# Patient Record
Sex: FEMALE | Race: WHITE | NOT HISPANIC OR LATINO | Employment: OTHER | URBAN - NONMETROPOLITAN AREA
[De-identification: names, ages, dates, MRNs, and addresses within clinical notes are randomized per-mention and may not be internally consistent; named-entity substitution may affect disease eponyms.]

---

## 2017-01-20 DIAGNOSIS — N32.81 OAB (OVERACTIVE BLADDER): Primary | ICD-10-CM

## 2017-01-20 NOTE — TELEPHONE ENCOUNTER
VES[employer]      Last Written Prescription Date: 11/19/16  Last Fill Quantity: 90,  # refills: 0   Last Office Visit with [facility], [facility] or [facility] prescribing provider: 6/21/16                                             Xochitl Coyle [facility]  [phone]

## 2017-01-24 PROBLEM — N32.81 OAB (OVERACTIVE BLADDER): Status: ACTIVE | Noted: 2017-01-24

## 2017-01-24 RX ORDER — SOLIFENACIN SUCCINATE 5 MG/1
5 TABLET, FILM COATED ORAL DAILY
Qty: 30 TABLET | Refills: 0 | Status: SHIPPED | OUTPATIENT
Start: 2017-01-24 | End: 2018-01-24

## 2017-02-15 DIAGNOSIS — F33.0 MAJOR DEPRESSIVE DISORDER, RECURRENT EPISODE, MILD (H): ICD-10-CM

## 2017-02-15 NOTE — TELEPHONE ENCOUNTER
Seroquel       Last Written Prescription Date: 12/6/2016  Last Fill Quantity: 90; # refills: 1  Last Office Visit with [facility], [facility] or  [facility] prescribing provider:  6/21/2016        Last PHQ-9 score on record=   PHQ-9 SCORE 4/26/2016   Total Score -   Total Score 5       Lab Results   Component Value Date    AST 18 06/21/2016     Lab Results   Component Value Date    ALT 16 06/21/2016

## 2017-02-17 RX ORDER — QUETIAPINE FUMARATE 50 MG/1
TABLET, FILM COATED ORAL
Qty: 90 TABLET | Refills: 0 | Status: SHIPPED
Start: 2017-02-17 | End: 2017-06-20

## 2017-02-17 NOTE — TELEPHONE ENCOUNTER
Routing refill request to provider for review/approval because:  Labs out of range:  MARLON Tavera RN  [facility]

## 2017-02-17 NOTE — TELEPHONE ENCOUNTER
BP Readings from Last 3 Encounters:   06/21/16 110/64   04/26/16 114/62   11/18/15 111/66     Pulse Readings from Last 2 Encounters:   06/21/16 72   04/26/16 76     Lab Results   Component Value Date    GLC 73 06/21/2016     Lab Results   Component Value Date    WBC 7.6 06/21/2016     Lab Results   Component Value Date    RBC 4.11 06/21/2016     Lab Results   Component Value Date    HGB 13.2 06/21/2016     Lab Results   Component Value Date    HCT 39.3 06/21/2016     No components found for: MCT  Lab Results   Component Value Date    MCV 96 06/21/2016     Lab Results   Component Value Date    MCH 32.1 06/21/2016     Lab Results   Component Value Date    MCHC 33.6 06/21/2016     Lab Results   Component Value Date    RDW 13.6 06/21/2016     Lab Results   Component Value Date     06/21/2016     Lab Results   Component Value Date    CHOL 225 06/21/2016     Lab Results   Component Value Date    HDL 56 06/21/2016     Lab Results   Component Value Date     06/21/2016     09/13/2011     Lab Results   Component Value Date    TRIG 114 06/21/2016     No results found for: CHOLHDLRATIO

## 2017-04-18 ENCOUNTER — TELEPHONE (OUTPATIENT)
Dept: FAMILY MEDICINE | Facility: OTHER | Age: 52
End: 2017-04-18

## 2017-04-18 DIAGNOSIS — F33.0 MAJOR DEPRESSIVE DISORDER, RECURRENT EPISODE, MILD (H): ICD-10-CM

## 2017-04-18 NOTE — TELEPHONE ENCOUNTER
Attempted outreach to patient: Left message    Patient is due for:  Pap  Depression questions    If patient had this completed elsewhere, please obtain approximate date, clinic/hospital where test was done and the result (abnormal/normal).    Please assist in scheduling if not completed.          Panel Management Review      Patient has the following on her problem list:       Composite cancer screening  Chart review shows that this patient is due/due soon for the following   Summary:    Patient is due/failing the following:   PAP and PHQ9    Action needed:   Patient needs office visit for physical. and Patient needs to do PHQ9.    Type of outreach:    Phone, left message for patient to call back.     Questions for provider review:    None                                                                                                                                    Dasia LUCAS LPN       Chart routed to Dasia LUCAS LPN   .

## 2017-04-18 NOTE — TELEPHONE ENCOUNTER
Nilsa returns call and message below is relayed.  Nilsa declines making an appt at this time due to no insurance.  Pt states she will let us know when she has insurance again.

## 2017-04-18 NOTE — TELEPHONE ENCOUNTER
Zoloft     Last Written Prescription Date: 10-  Last Fill Quantity: 90, # refills: 1  Last Office Visit with [facility] primary care provider:  6-        Last PHQ-9 score on record=   PHQ-9 SCORE 4/26/2016   Total Score -   Total Score 5

## 2017-04-19 RX ORDER — SERTRALINE HYDROCHLORIDE 100 MG/1
TABLET, FILM COATED ORAL
Qty: 30 TABLET | Refills: 0 | Status: SHIPPED | OUTPATIENT
Start: 2017-04-19 | End: 2017-10-09

## 2017-04-19 NOTE — TELEPHONE ENCOUNTER
Routing refill request to provider for review/approval because:  PHQ-9 >4    Maris Tavera, RN  [facility]

## 2017-05-19 DIAGNOSIS — F33.0 MAJOR DEPRESSIVE DISORDER, RECURRENT EPISODE, MILD (H): ICD-10-CM

## 2017-05-19 NOTE — TELEPHONE ENCOUNTER
Routing refill request to provider for review/approval because:  Patient needs to be seen because:  Needs to establish care with provider. Due for office visit  PHQ-9 >4  PHQ-9 overdue    Maris Tavera RN  [facility]

## 2017-05-19 NOTE — TELEPHONE ENCOUNTER
zoloft      Last Written Prescription Date: 10/22/2016  Last Fill Quantity: 90, # refills: 1  Last Office Visit with [facility] primary care provider:  6/21/2016        Last PHQ-9 score on record=   PHQ-9 SCORE 4/26/2016   Total Score -   Total Score 5

## 2017-05-20 RX ORDER — SERTRALINE HYDROCHLORIDE 100 MG/1
TABLET, FILM COATED ORAL
Qty: 30 TABLET | Refills: 0 | Status: SHIPPED | OUTPATIENT
Start: 2017-05-20 | End: 2017-06-20

## 2017-06-02 ENCOUNTER — TELEPHONE (OUTPATIENT)
Dept: FAMILY MEDICINE | Facility: OTHER | Age: 52
End: 2017-06-02

## 2017-06-02 NOTE — TELEPHONE ENCOUNTER
Reason for call:  Patient reporting a symptom    Symptom or request: tick bite    Duration (how long have symptoms been present): 1 day    Have you been treated for this before? No    Additional comments: Nilsa would like to speak to a nurse regarding a tick bite    Phone Number patient can be reached at:  Cell number on file:    Telephone Information:   Mobile [phone]       Best Time:      Can we leave a detailed message on this number:  YES    Call taken on 6/2/2017 at 10:12 AM by Emely Giraldo

## 2017-06-02 NOTE — TELEPHONE ENCOUNTER
RN Tickbite Protocol: Ages 8 and older  Nilsa Chance is a 51 year old female is being assessed for indication of tick bite.  Pt states that she removed tick last evening around 5 pm, and showered, cleaned the area and applied some antibiotic ointment.        ASSESSMENT/PLAN:   1.  Patient instructions without treatment.   2.  Education: How to identify a deer tick, Important time frames related to tick bite and Preventing tick bites  3.  Follow-up: Follow with new/worsening symptoms.  4.  Patient/parent verbalized understanding of plan and is agreeable.     SUBJECTIVE/OBJECTIVE:  Removal of tick that has been attached at least 36 hours? no   Tick is smaller, redder, no white striping on back and more triangular in shape? no Pt states that tick seems a little smaller than an apple seed, but she is unsure of whether it is a deer tick.  State tick appears blackish.  Pt is concerned because her niece has been dealing with Lyme's Disease, and concerned about other tick illnesses that she's heard about.  Tick was removed within the last 72 hours? yes   Location on body of suspected tick bite: Backside of neck   Symptoms:  Denies Rash, weakness, body aches and fever >101F  Complicating factors:  Reports: NA   Denies: Allergy to Doxycycline, Age less than 8, Breast feeding, Pregnant and Greater than 72 hours since tick removed    NURSING PLAN: Routed to provider Yes and Nursing advice to patient see below    RECOMMENDED DISPOSITION:  Advised pt that tick she is describing sounds like a wood tick, not a deer tick.  Pt is concerned about other tick illnesses and Lyme's, and wondering if she needs any treatment.  States she doesn't have insurance right now, so is hoping to not need an office visit.  Advised pt that best plan of action is to monitor site for infection, and monitor for any new symptoms and to call the clinic if either occurs.  Advised pt that I can route to provider for review to see if any treatment is  indicated. Pt aware that we will only call back if treatment is advised.  Routing to covering provider, Dr. Gillis please advise.  Will comply with recommendation: N/A    Encounter handled by: Nurse Triage.    Pedro Hall RN

## 2017-06-20 ENCOUNTER — TELEPHONE (OUTPATIENT)
Dept: FAMILY MEDICINE | Facility: OTHER | Age: 52
End: 2017-06-20

## 2017-06-20 DIAGNOSIS — F33.0 MAJOR DEPRESSIVE DISORDER, RECURRENT EPISODE, MILD (H): ICD-10-CM

## 2017-06-20 RX ORDER — SERTRALINE HYDROCHLORIDE 100 MG/1
TABLET, FILM COATED ORAL
Qty: 90 TABLET | Refills: 0 | Status: SHIPPED | OUTPATIENT
Start: 2017-06-20 | End: 2017-09-18

## 2017-06-20 RX ORDER — QUETIAPINE FUMARATE 50 MG/1
TABLET, FILM COATED ORAL
Qty: 90 TABLET | Refills: 0 | Status: SHIPPED | OUTPATIENT
Start: 2017-06-20 | End: 2018-01-24

## 2017-06-24 ENCOUNTER — HEALTH MAINTENANCE LETTER (OUTPATIENT)
Age: 52
End: 2017-06-24

## 2017-09-18 DIAGNOSIS — F33.0 MAJOR DEPRESSIVE DISORDER, RECURRENT EPISODE, MILD (H): ICD-10-CM

## 2017-09-19 RX ORDER — SERTRALINE HYDROCHLORIDE 100 MG/1
100 TABLET, FILM COATED ORAL DAILY
Qty: 90 TABLET | Refills: 1 | Status: CANCELLED | OUTPATIENT
Start: 2017-09-19

## 2017-09-19 NOTE — TELEPHONE ENCOUNTER
sertraline (ZOLOFT) 100 MG tablet     Last Written Prescription Date: 6/20/2017  Last Fill Quantity: 90, # refills: 0  Last Office Visit with [facility] primary care provider:  6/21/2016        Last PHQ-9 score on record=   PHQ-9 SCORE 4/26/2016   Total Score -   Total Score 5

## 2017-09-20 RX ORDER — SERTRALINE HYDROCHLORIDE 100 MG/1
TABLET, FILM COATED ORAL
Qty: 15 TABLET | Refills: 0 | Status: SHIPPED | OUTPATIENT
Start: 2017-09-20 | End: 2017-10-09

## 2017-09-20 NOTE — TELEPHONE ENCOUNTER
Routing refill request to provider for review/approval because:  Isabelle given x1 and patient did not follow up  Patient needs to be seen because it has been more than 1 year since last office visit.    Martha Wolff, RN, BSN

## 2017-10-05 NOTE — PROGRESS NOTES
SUBJECTIVE:                                                    Nilsa Chance is a 51 year old female who presents to clinic today for the following health issues:      History of Present Illness   Depression & Anxiety Follow-up:     Depression/Anxiety:  Depression & Anxiety    Status since last visit::  Stable    Other associated symptoms of depression and anxiety::  YES    Significant life event::  YES ( is unemployed.)    Current substance use::  None  Diet::  Regular (no restrictions)  Frequency of exercise::  2-3 days/week  Duration of exercise::  15-30 minutes  Taking medications regularly::  Yes  Medication side effects::  None  Additional concerns today::  No    Problem list and histories reviewed & adjusted, as indicated.  Additional history: as documented    Patient Active Problem List   Diagnosis     Generalized anxiety disorder     Psychophysiological insomnia     Menopausal syndrome (hot flashes)     CARDIOVASCULAR SCREENING; LDL GOAL LESS THAN 160     Arthritis of big toe-Left     Major depressive disorder, recurrent episode, mild (H)     Other acne     Bee sting allergy     Hyperlipidemia LDL goal <100     OAB (overactive bladder)     Tobacco use disorder     Past Surgical History:   Procedure Laterality Date     BACK SURGERY  2001,2003     C/SECTION, LOW TRANSVERSE      C-Sections x2     ORTHOPEDIC SURGERY      great toe R fused.     PARTIAL HYSTERECTOMY  2007    Menorrhagia, sparing cervix     TONSILLECTOMY & ADENOIDECTOMY         Social History   Substance Use Topics     Smoking status: Current Every Day Smoker     Packs/day: 0.80     Years: 20.00     Types: Cigarettes     Smokeless tobacco: Never Used     Alcohol use No     Family History   Problem Relation Age of Onset     Thyroid Disease Mother      goiter and then drained and put on synthroid.     DIABETES Paternal Grandmother              ROS:  Constitutional, HEENT, cardiovascular, pulmonary, gi and gu systems are negative, except  "as otherwise noted.      OBJECTIVE:   /64 (Cuff Size: Adult Regular)  Pulse 72  Temp 97.6  F (36.4  C) (Temporal)  Resp 16  Ht 5' 4.5\" (1.638 m)  Wt 141 lb 6.4 oz (64.1 kg)  BMI 23.9 kg/m2  Body mass index is 23.9 kg/(m^2).  GENERAL: healthy, alert and no distress  RESP: lungs clear to auscultation - no rales, rhonchi or wheezes  CV: regular rate and rhythm, normal S1 S2, no S3 or S4, no murmur, click or rub, no peripheral edema and peripheral pulses strong  MS: no gross musculoskeletal defects noted, no edema  PSYCH: mentation appears normal, affect normal/bright    Diagnostic Test Results:  none     ASSESSMENT/PLAN:     1. Tobacco use disorder  Advised cessation - she is \"working on it\"  - TOBACCO CESSATION - FOR HEALTH MAINTENANCE  - DEPRESSION ACTION PLAN (DAP)    2. Major depressive disorder, recurrent episode, mild (H)  Doing well, no new concerns, denies suicidal thoughts and ideation.  - QUEtiapine (SEROQUEL) 50 MG tablet; Take 1 tablet (50 mg) by mouth At Bedtime  Dispense: 90 tablet; Refill: 1  - sertraline (ZOLOFT) 100 MG tablet; Take 1 tablet (100 mg) by mouth daily  Dispense: 90 tablet; Refill: 1    3. Psychophysiological insomnia  As directed    4. Special screening for malignant neoplasms, colon  Advised completion - she is currently without any employment as well as no health insurance. I did give her the information for the LUISA exam so that we can consider doing mammography and Pap smear she will look into this I think that they may cover the fit test as well otherwise is worse than $93 that it costs.  - Fecal colorectal cancer screen FIT; Future    Nick Barry PA-C  [facility]  Answers for HPI/ROS submitted by the patient on 10/9/2017   PHQ-2 Score: 0  If you checked off any problems, how difficult have these problems made it for you to do your work, take care of things at home, or get along with other people?: Not difficult at all  PHQ9 TOTAL SCORE: 1  TERRY 7 TOTAL " SCORE: 3

## 2017-10-09 ENCOUNTER — OFFICE VISIT (OUTPATIENT)
Dept: FAMILY MEDICINE | Facility: OTHER | Age: 52
End: 2017-10-09

## 2017-10-09 VITALS
RESPIRATION RATE: 16 BRPM | HEART RATE: 72 BPM | BODY MASS INDEX: 23.56 KG/M2 | WEIGHT: 141.4 LBS | DIASTOLIC BLOOD PRESSURE: 64 MMHG | HEIGHT: 65 IN | SYSTOLIC BLOOD PRESSURE: 110 MMHG | TEMPERATURE: 97.6 F

## 2017-10-09 DIAGNOSIS — F51.04 PSYCHOPHYSIOLOGICAL INSOMNIA: ICD-10-CM

## 2017-10-09 DIAGNOSIS — F33.0 MAJOR DEPRESSIVE DISORDER, RECURRENT EPISODE, MILD (H): ICD-10-CM

## 2017-10-09 DIAGNOSIS — F17.200 TOBACCO USE DISORDER: Primary | ICD-10-CM

## 2017-10-09 DIAGNOSIS — Z12.11 SPECIAL SCREENING FOR MALIGNANT NEOPLASMS, COLON: ICD-10-CM

## 2017-10-09 PROCEDURE — 99213 OFFICE O/P EST LOW 20 MIN: CPT | Performed by: PHYSICIAN ASSISTANT

## 2017-10-09 RX ORDER — QUETIAPINE FUMARATE 50 MG/1
TABLET, FILM COATED ORAL
Qty: 90 TABLET | Refills: 0 | Status: CANCELLED | OUTPATIENT
Start: 2017-10-09

## 2017-10-09 RX ORDER — SERTRALINE HYDROCHLORIDE 100 MG/1
100 TABLET, FILM COATED ORAL DAILY
Qty: 90 TABLET | Refills: 1 | Status: SHIPPED | OUTPATIENT
Start: 2017-10-09 | End: 2018-04-04

## 2017-10-09 RX ORDER — LORAZEPAM 0.5 MG/1
.5-1 TABLET ORAL EVERY 8 HOURS PRN
Qty: 16 TABLET | Refills: 0 | Status: CANCELLED | OUTPATIENT
Start: 2017-10-09

## 2017-10-09 RX ORDER — QUETIAPINE FUMARATE 50 MG/1
50 TABLET, FILM COATED ORAL AT BEDTIME
Qty: 90 TABLET | Refills: 1 | Status: SHIPPED | OUTPATIENT
Start: 2017-10-09 | End: 2018-04-30

## 2017-10-09 ASSESSMENT — ANXIETY QUESTIONNAIRES
7. FEELING AFRAID AS IF SOMETHING AWFUL MIGHT HAPPEN: NOT AT ALL
GAD7 TOTAL SCORE: 3
7. FEELING AFRAID AS IF SOMETHING AWFUL MIGHT HAPPEN: NOT AT ALL
GAD7 TOTAL SCORE: 3
GAD7 TOTAL SCORE: 3
3. WORRYING TOO MUCH ABOUT DIFFERENT THINGS: SEVERAL DAYS
1. FEELING NERVOUS, ANXIOUS, OR ON EDGE: SEVERAL DAYS
5. BEING SO RESTLESS THAT IT IS HARD TO SIT STILL: NOT AT ALL
4. TROUBLE RELAXING: NOT AT ALL
2. NOT BEING ABLE TO STOP OR CONTROL WORRYING: SEVERAL DAYS
6. BECOMING EASILY ANNOYED OR IRRITABLE: NOT AT ALL

## 2017-10-09 ASSESSMENT — PAIN SCALES - GENERAL: PAINLEVEL: NO PAIN (0)

## 2017-10-09 ASSESSMENT — PATIENT HEALTH QUESTIONNAIRE - PHQ9
SUM OF ALL RESPONSES TO PHQ QUESTIONS 1-9: 1
10. IF YOU CHECKED OFF ANY PROBLEMS, HOW DIFFICULT HAVE THESE PROBLEMS MADE IT FOR YOU TO DO YOUR WORK, TAKE CARE OF THINGS AT HOME, OR GET ALONG WITH OTHER PEOPLE: NOT DIFFICULT AT ALL
SUM OF ALL RESPONSES TO PHQ QUESTIONS 1-9: 1

## 2017-10-09 NOTE — NURSING NOTE
"Chief Complaint   Patient presents with     Recheck Medication     Panel Management     MyChart, Flu shot, FIT test, Pap, Tobacco cessation, dap, phq, yolette       Initial /64 (Cuff Size: Adult Regular)  Pulse 72  Temp 97.6  F (36.4  C) (Temporal)  Resp 16  Ht 5' 4.5\" (1.638 m)  Wt 141 lb 6.4 oz (64.1 kg)  BMI 23.9 kg/m2 Estimated body mass index is 23.9 kg/(m^2) as calculated from the following:    Height as of this encounter: 5' 4.5\" (1.638 m).    Weight as of this encounter: 141 lb 6.4 oz (64.1 kg).  Medication Reconciliation: complete   Luh Cage CMA (AAMA)    "

## 2017-10-09 NOTE — LETTER
My Depression Action Plan  Name: Nilsa Chance   Date of Birth 1965  Date: 10/5/2017    My doctor: Nick Felder   My clinic: [address] [address] [phone]          GREEN    ZONE   Good Control    What it looks like:     Things are going generally well. You have normal up s and down s. You may even feel depressed from time to time, but bad moods usually last less than a day.   What you need to do:  1. Continue to care for yourself (see self care plan)  2. Check your depression survival kit and update it as needed  3. Follow your physician s recommendations including any medication.  4. Do not stop taking medication unless you consult with your physician first.           YELLOW         ZONE Getting Worse    What it looks like:     Depression is starting to interfere with your life.     It may be hard to get out of bed; you may be starting to isolate yourself from others.    Symptoms of depression are starting to last most all day and this has happened for several days.     You may have suicidal thoughts but they are not constant.   What you need to do:     1. Call your care team, your response to treatment will improve if you keep your care team informed of your progress. Yellow periods are signs an adjustment may need to be made.     2. Continue your self-care, even if you have to fake it!    3. Talk to someone in your support network    4. Open up your depression survival kit           RED    ZONE Medical Alert - Get Help    What it looks like:     Depression is seriously interfering with your life.     You may experience these or other symptoms: You can t get out of bed most days, can t work or engage in other necessary activities, you have trouble taking care of basic hygiene, or basic responsibilities, thoughts of suicide or death that will not go away, self-injurious behavior.     What you need to do:  1. Call your care team and  request a same-day appointment. If they are not available (weekends or after hours) call your local crisis line, emergency room or 911.      Electronically signed by: Shasha Collins, October 5, 2017    Depression Self Care Plan / Survival Kit    Self-Care for Depression  Here s the deal. Your body and mind are really not as separate as most people think.  What you do and think affects how you feel and how you feel influences what you do and think. This means if you do things that people who feel good do, it will help you feel better.  Sometimes this is all it takes.  There is also a place for medication and therapy depending on how severe your depression is, so be sure to consult with your medical provider and/ or Behavioral Health Consultant if your symptoms are worsening or not improving.     In order to better manage my stress, I will:    Exercise  Get some form of exercise, every day. This will help reduce pain and release endorphins, the  feel good  chemicals in your brain. This is almost as good as taking antidepressants!  This is not the same as joining a gym and then never going! (they count on that by the way ) It can be as simple as just going for a walk or doing some gardening, anything that will get you moving.      Hygiene   Maintain good hygiene (Get out of bed in the morning, Make your bed, Brush your teeth, Take a shower, and Get dressed like you were going to work, even if you are unemployed).  If your clothes don't fit try to get ones that do.    Diet  I will strive to eat foods that are good for me, drink plenty of water, and avoid excessive sugar, caffeine, alcohol, and other mood-altering substances.  Some foods that are helpful in depression are: complex carbohydrates, B vitamins, flaxseed, fish or fish oil, fresh fruits and vegetables.    Psychotherapy  I agree to participate in Individual Therapy (if recommended).    Medication  If prescribed medications, I agree to take them.   Missing doses can result in serious side effects.  I understand that drinking alcohol, or other illicit drug use, may cause potential side effects.  I will not stop my medication abruptly without first discussing it with my provider.    Staying Connected With Others  I will stay in touch with my friends, family members, and my primary care provider/team.    Use your imagination  Be creative.  We all have a creative side; it doesn t matter if it s oil painting, sand castles, or mud pies! This will also kick up the endorphins.    Witness Beauty  (AKA stop and smell the roses) Take a look outside, even in mid-winter. Notice colors, textures. Watch the squirrels and birds.     Service to others  Be of service to others.  There is always someone else in need.  By helping others we can  get out of ourselves  and remember the really important things.  This also provides opportunities for practicing all the other parts of the program.    Humor  Laugh and be silly!  Adjust your TV habits for less news and crime-drama and more comedy.    Control your stress  Try breathing deep, massage therapy, biofeedback, and meditation. Find time to relax each day.     My support system    Clinic Contact:  Phone number:    Contact 1:  Phone number:    Contact 2:  Phone number:    Taoism/:  Phone number:    Therapist:  Phone number:    Local crisis center:    Phone number:    Other community support:  Phone number:

## 2017-10-09 NOTE — MR AVS SNAPSHOT
After Visit Summary   10/9/2017    Nilsa Chance    MRN: [id number]           Patient Information     Date Of Birth          1965        Visit Information        Provider Department      10/9/2017 2:00 PM Nick Felder PA-C [facility]        Today's Diagnoses     Tobacco use disorder    -  1    Menopausal syndrome (hot flashes)        Major depressive disorder, recurrent episode, mild (H)        Psychophysiological insomnia        Special screening for malignant neoplasms, colon           Follow-ups after your visit        Your next 10 appointments already scheduled     Oct 09, 2017  2:00 PM CDT   Office Visit with Nick Felder PA-C   [facility]    [address] [facility] [address] [phone]           Bring a current list of meds and any records pertaining to this visit. For Physicals, please bring immunization records and any forms needing to be filled out. Please arrive 10 minutes early to complete paperwork.              Future tests that were ordered for you today     Open Future Orders        Priority Expected Expires Ordered    Fecal colorectal cancer screen FIT Routine 10/26/2017 12/28/2017 10/9/2017            Who to contact     If you have questions or need follow up information about today's clinic visit or your schedule please contact [facility] directly at [phone].  Normal or non-critical lab and imaging results will be communicated to you by MyChart, letter or phone within 4 business days after the clinic has received the results. If you do not hear from us within 7 days, please contact the clinic through MyChart or phone. If you have a critical or abnormal lab result, we will notify you by phone as soon as possible.  Submit refill requests through [employer] or call your pharmacy and they will forward the refill request to us. Please allow 3 business days for your refill to be  "completed.          Additional Information About Your Visit        Se[employer] Information     [employer] lets you send messages to your doctor, view your test results, renew your prescriptions, schedule appointments and more. To sign up, go to [email or web address] . Click on \"Log in\" on the left side of the screen, which will take you to the Welcome page. Then click on \"Sign up Now\" on the right side of the page.     You will be asked to enter the access code listed below, as well as some personal information. Please follow the directions to create your username and password.     Your access code is: [email or web address]  Expires: 2018  1:51 PM     Your access code will  in 90 days. If you need help or a new code, please call your [place] clinic or [phone].        Care EveryWhere ID     This is your Care EveryWhere ID. This could be used by other organizations to access your [place] medical records  [id number]        Your Vitals Were     Pulse Temperature Respirations Height BMI (Body Mass Index)       72 97.6  F (36.4  C) (Temporal) 16 5' 4.5\" (1.638 m) 23.9 kg/m2        Blood Pressure from Last 3 Encounters:   10/09/17 110/64   16 110/64   16 114/62    Weight from Last 3 Encounters:   10/09/17 141 lb 6.4 oz (64.1 kg)   16 139 lb 9.6 oz (63.3 kg)   16 140 lb (63.5 kg)              We Performed the Following     DEPRESSION ACTION PLAN (DAP)     TOBACCO CESSATION - FOR HEALTH MAINTENANCE          Today's Medication Changes          These changes are accurate as of: 10/9/17  1:51 PM.  If you have any questions, ask your nurse or doctor.               These medicines have changed or have updated prescriptions.        Dose/Directions    * QUEtiapine 50 MG tablet   Commonly known as:  SEROquel   This may have changed:  Another medication with the same name was changed. Make sure you understand how and when to take each.   Used for:  Major depressive disorder, recurrent episode, mild (H) "   Changed by:  Nick Felder PA-C        TAKE ONE-HALF TO ONE TABLET BY MOUTH AT BEDTIME --NEED OFFICE VISIT   Quantity:  90 tablet   Refills:  0       * QUEtiapine 50 MG tablet   Commonly known as:  SEROquel   This may have changed:  See the new instructions.   Used for:  Major depressive disorder, recurrent episode, mild (H)   Changed by:  Nick Felder PA-C        Dose:  50 mg   Take 1 tablet (50 mg) by mouth At Bedtime   Quantity:  90 tablet   Refills:  1       sertraline 100 MG tablet   Commonly known as:  ZOLOFT   This may have changed:  See the new instructions.   Used for:  Major depressive disorder, recurrent episode, mild (H)   Changed by:  Nick Felder PA-C        Dose:  100 mg   Take 1 tablet (100 mg) by mouth daily   Quantity:  90 tablet   Refills:  1       * Notice:  This list has 2 medication(s) that are the same as other medications prescribed for you. Read the directions carefully, and ask your doctor or other care provider to review them with you.         Where to get your medicines      These medications were sent to [facility] [address] [facility] [address] [place] [address]     Phone:  [phone]     QUEtiapine 50 MG tablet    sertraline 100 MG tablet                Primary Care Provider Office Phone # Fax #    Nick Felder PA-C [phone]       [address] [facility] [address]        Equal Access to Services     [facility] Hospital[facility] : Hadii isaura fajardo hadkateo Sonoe, waaxda luqadaha, qaybta kaalmada aderyanyada, shelby hollingsworth . So [facility] [phone].    ATENCIÓN: Si habla español, tiene a paredes disposición servicios gratuitos de asistencia lingüística. Derikame al [phone].    We comply with applicable federal civil rights laws and [place] laws. We do not discriminate on the basis of race, color, national origin, age, disability, sex, sexual orientation, or gender identity.            Thank you!     Thank you for choosing [place]  [facility]  for your care. Our goal is always to provide you with excellent care. Hearing back from our patients is one way we can continue to improve our services. Please take a few minutes to complete the written survey that you may receive in the mail after your visit with us. Thank you!             Your Updated Medication List - Protect others around you: Learn how to safely use, store and throw away your medicines at www.disposemymeds.org.          This list is accurate as of: 10/9/17  1:51 PM.  Always use your most recent med list.                   Brand Name Dispense Instructions for use Diagnosis    clindamycin 1 % topical gel    CLINDAMAX    60 g    Apply topically 2 times daily    Other acne       cyclobenzaprine 10 MG tablet    FLEXERIL    30 tablet    Take 1 tablet (10 mg) by mouth 3 times daily as needed for muscle spasms    Neck pain       EPINEPHrine 0.3 MG/0.3ML injection 2-pack    EPIPEN 2-TRES    2 each    Inject 0.3 mLs (0.3 mg) into the muscle once as needed for anaphylaxis    Allergy to insect bites and stings       lactobacillus Tabs      Take 1 tablet by mouth daily.        LORazepam 0.5 MG tablet    ATIVAN    16 tablet    Take 1-2 tablets (0.5-1 mg) by mouth every 8 hours as needed for anxiety    Generalised anxiety disorder       * QUEtiapine 50 MG tablet    SEROquel    90 tablet    TAKE ONE-HALF TO ONE TABLET BY MOUTH AT BEDTIME --NEED OFFICE VISIT    Major depressive disorder, recurrent episode, mild (H)       * QUEtiapine 50 MG tablet    SEROquel    90 tablet    Take 1 tablet (50 mg) by mouth At Bedtime    Major depressive disorder, recurrent episode, mild (H)       sertraline 100 MG tablet    ZOLOFT    90 tablet    Take 1 tablet (100 mg) by mouth daily    Major depressive disorder, recurrent episode, mild (H)       simvastatin 20 MG tablet    ZOCOR    90 tablet    TAKE ONE TABLET BY MOUTH AT BEDTIME    Hyperlipidemia LDL goal <100       solifenacin 5 MG tablet    VESICARE    30  tablet    Take 1 tablet (5 mg) by mouth daily    OAB (overactive bladder)       * Notice:  This list has 2 medication(s) that are the same as other medications prescribed for you. Read the directions carefully, and ask your doctor or other care provider to review them with you.

## 2017-10-10 ASSESSMENT — ANXIETY QUESTIONNAIRES: GAD7 TOTAL SCORE: 3

## 2017-10-10 ASSESSMENT — PATIENT HEALTH QUESTIONNAIRE - PHQ9: SUM OF ALL RESPONSES TO PHQ QUESTIONS 1-9: 1

## 2017-12-11 ENCOUNTER — TELEPHONE (OUTPATIENT)
Dept: FAMILY MEDICINE | Facility: OTHER | Age: 52
End: 2017-12-11

## 2017-12-11 DIAGNOSIS — Z12.31 ENCOUNTER FOR SCREENING MAMMOGRAM FOR BREAST CANCER: Primary | ICD-10-CM

## 2017-12-11 NOTE — TELEPHONE ENCOUNTER
Summary:    Patient is due/failing the following:   FIT and PAP    Action needed:   Patient needs office visit for PAP . and complete a FIT test    Type of outreach:    Phone, spoke to patient.  patient scheduled    Questions for provider review:    None                                                                                                                                    Roopa Rico       Chart routed to Care Team .      Panel Management Review      Patient has the following on her problem list:     Depression / Dysthymia review    Measure:  Needs PHQ-9 score of 4 or less during index window.  Administer PHQ-9 and if score is 5 or more, send encounter to provider for next steps.      PHQ-9 SCORE 11/24/2014 4/26/2016 10/9/2017   Total Score 0 - -   Total Score MyChart - - 1 (Minimal depression)   Total Score - 5 1       If PHQ-9 recheck is 5 or more, route to provider for next steps.    Patient is due for:  PHQ9        Composite cancer screening  Chart review shows that this patient is due/due soon for the following Pap Smear and Fecal Colorectal (FIT)

## 2018-01-24 ENCOUNTER — OFFICE VISIT (OUTPATIENT)
Dept: FAMILY MEDICINE | Facility: OTHER | Age: 53
End: 2018-01-24
Payer: COMMERCIAL

## 2018-01-24 VITALS
HEIGHT: 64 IN | TEMPERATURE: 98.2 F | BODY MASS INDEX: 23.39 KG/M2 | WEIGHT: 137 LBS | SYSTOLIC BLOOD PRESSURE: 110 MMHG | HEART RATE: 82 BPM | RESPIRATION RATE: 16 BRPM | DIASTOLIC BLOOD PRESSURE: 76 MMHG | OXYGEN SATURATION: 99 %

## 2018-01-24 DIAGNOSIS — Z12.4 SCREENING FOR CERVICAL CANCER: ICD-10-CM

## 2018-01-24 DIAGNOSIS — N95.2 VAGINAL ATROPHY: ICD-10-CM

## 2018-01-24 DIAGNOSIS — Z13.1 SCREENING FOR DIABETES MELLITUS: ICD-10-CM

## 2018-01-24 DIAGNOSIS — Z00.00 ROUTINE GENERAL MEDICAL EXAMINATION AT A HEALTH CARE FACILITY: Primary | ICD-10-CM

## 2018-01-24 DIAGNOSIS — N94.10 DYSPAREUNIA IN FEMALE: ICD-10-CM

## 2018-01-24 DIAGNOSIS — R68.82 DECREASED SEX DRIVE: ICD-10-CM

## 2018-01-24 DIAGNOSIS — Z13.6 CARDIOVASCULAR SCREENING; LDL GOAL LESS THAN 160: ICD-10-CM

## 2018-01-24 PROCEDURE — G0145 SCR C/V CYTO,THINLAYER,RESCR: HCPCS | Performed by: NURSE PRACTITIONER

## 2018-01-24 PROCEDURE — 87624 HPV HI-RISK TYP POOLED RSLT: CPT | Performed by: NURSE PRACTITIONER

## 2018-01-24 PROCEDURE — 99396 PREV VISIT EST AGE 40-64: CPT | Performed by: NURSE PRACTITIONER

## 2018-01-24 NOTE — PATIENT INSTRUCTIONS
The results of the pap test take about 2 weeks to come back.  We will send a letter with these results and the recommendations for further pap tests in the future.     Come back for fasting labs.     I put in a referral for OB/GYN if you want to see them.     Preventive Health Recommendations  Female Ages 50 - 64    Yearly exam: See your health care provider every year in order to  o Review health changes.   o Discuss preventive care.    o Review your medicines if your doctor has prescribed any.      Get a Pap test every three years (unless you have an abnormal result and your provider advises testing more often).    If you get Pap tests with HPV test, you only need to test every 5 years, unless you have an abnormal result.     You do not need a Pap test if your uterus was removed (hysterectomy) and you have not had cancer.    You should be tested each year for STDs (sexually transmitted diseases) if you're at risk.     Have a mammogram every 1 to 2 years.    Have a colonoscopy at age 50, or have a yearly FIT test (stool test). These exams screen for colon cancer.      Have a cholesterol test every 5 years, or more often if advised.    Have a diabetes test (fasting glucose) every three years. If you are at risk for diabetes, you should have this test more often.     If you are at risk for osteoporosis (brittle bone disease), think about having a bone density scan (DEXA).    Shots: Get a flu shot each year. Get a tetanus shot every 10 years.    Nutrition:     Eat at least 5 servings of fruits and vegetables each day.    Eat whole-grain bread, whole-wheat pasta and brown rice instead of white grains and rice.    Talk to your provider about Calcium and Vitamin D.     Lifestyle    Exercise at least 150 minutes a week (30 minutes a day, 5 days a week). This will help you control your weight and prevent disease.    Limit alcohol to one drink per day.    No smoking.     Wear sunscreen to prevent skin cancer.     See your  dentist every six months for an exam and cleaning.    See your eye doctor every 1 to 2 years.

## 2018-01-24 NOTE — MR AVS SNAPSHOT
After Visit Summary   1/24/2018    Nilsa Chance    MRN: [id number]           Patient Information     Date Of Birth          1965        Visit Information        Provider Department      1/24/2018 4:20 PM Leanne Fitzpatrick APRN [facility]        Today's Diagnoses     Routine general medical examination at a health care facility    -  1    Screening for cervical cancer        Screening for diabetes mellitus        CARDIOVASCULAR SCREENING; LDL GOAL LESS THAN 160        Dyspareunia in female        Vaginal atrophy        Decreased sex drive          Care Instructions    The results of the pap test take about 2 weeks to come back.  We will send a letter with these results and the recommendations for further pap tests in the future.     Come back for fasting labs.     I put in a referral for OB/GYN if you want to see them.     Preventive Health Recommendations  Female Ages 50 - 64    Yearly exam: See your health care provider every year in order to  o Review health changes.   o Discuss preventive care.    o Review your medicines if your doctor has prescribed any.      Get a Pap test every three years (unless you have an abnormal result and your provider advises testing more often).    If you get Pap tests with HPV test, you only need to test every 5 years, unless you have an abnormal result.     You do not need a Pap test if your uterus was removed (hysterectomy) and you have not had cancer.    You should be tested each year for STDs (sexually transmitted diseases) if you're at risk.     Have a mammogram every 1 to 2 years.    Have a colonoscopy at age 50, or have a yearly FIT test (stool test). These exams screen for colon cancer.      Have a cholesterol test every 5 years, or more often if advised.    Have a diabetes test (fasting glucose) every three years. If you are at risk for diabetes, you should have this test more often.     If you are at risk for osteoporosis (brittle  bone disease), think about having a bone density scan (DEXA).    Shots: Get a flu shot each year. Get a tetanus shot every 10 years.    Nutrition:     Eat at least 5 servings of fruits and vegetables each day.    Eat whole-grain bread, whole-wheat pasta and brown rice instead of white grains and rice.    Talk to your provider about Calcium and Vitamin D.     Lifestyle    Exercise at least 150 minutes a week (30 minutes a day, 5 days a week). This will help you control your weight and prevent disease.    Limit alcohol to one drink per day.    No smoking.     Wear sunscreen to prevent skin cancer.     See your dentist every six months for an exam and cleaning.    See your eye doctor every 1 to 2 years.            Follow-ups after your visit        Additional Services     OB/GYN REFERRAL       Your provider has referred you to:  [facility]: [facility] [phone]   [email or web address]    Please be aware that coverage of these services is subject to the terms and limitations of your health insurance plan.  Call member services at your health plan with any benefit or coverage questions.      Please bring the following with you to your appointment:    (1) Any X-Rays, CTs or MRIs which have been performed.  Contact the facility where they were done to arrange for  prior to your scheduled appointment.   (2) List of current medications   (3) This referral request   (4) Any documents/labs given to you for this referral                  Future tests that were ordered for you today     Open Future Orders        Priority Expected Expires Ordered    Lipid panel reflex to direct LDL Fasting Routine  1/24/2019 1/24/2018    Glucose Routine  1/24/2019 1/24/2018            Who to contact     If you have questions or need follow up information about today's clinic visit or your schedule please contact [facility] directly at [phone].  Normal or non-critical lab and  "imaging results will be communicated to you by MyChart, letter or phone within 4 business days after the clinic has received the results. If you do not hear from us within 7 days, please contact the clinic through [employer] or phone. If you have a critical or abnormal lab result, we will notify you by phone as soon as possible.  Submit refill requests through [employer] or call your pharmacy and they will forward the refill request to us. Please allow 3 business days for your refill to be completed.          Additional Information About Your Visit        [employer] Information     [employer] lets you send messages to your doctor, view your test results, renew your prescriptions, schedule appointments and more. To sign up, go to [email or web address] . Click on \"Log in\" on the left side of the screen, which will take you to the Welcome page. Then click on \"Sign up Now\" on the right side of the page.     You will be asked to enter the access code listed below, as well as some personal information. Please follow the directions to create your username and password.     Your access code is: [email or web address]  Expires: 2018  4:59 PM     Your access code will  in 90 days. If you need help or a new code, please call your [place] clinic or [phone].        Care EveryWhere ID     This is your Care EveryWhere ID. This could be used by other organizations to access your [place] medical records  [id number]        Your Vitals Were     Pulse Temperature Respirations Height Pulse Oximetry Breastfeeding?    82 98.2  F (36.8  C) (Tympanic) 16 5' 4\" (1.626 m) 99% No    BMI (Body Mass Index)                   23.52 kg/m2            Blood Pressure from Last 3 Encounters:   18 110/76   10/09/17 110/64   16 110/64    Weight from Last 3 Encounters:   18 137 lb (62.1 kg)   10/09/17 141 lb 6.4 oz (64.1 kg)   16 139 lb 9.6 oz (63.3 kg)              We Performed the Following     HPV High Risk Types DNA Cervical  "    OB/GYN REFERRAL     Pap imaged thin layer screen with HPV - recommended age 30 - 65        Primary Care Provider Office Phone # Fax #    Nick Felder PA-C [phone]       [facility] [id number] [place] [id number]        Equal Access to Services     SETH ABBASI : Hadii isaura ku hadasho Soomaali, waaxda luqadaha, qaybta kaalmada adeegyada, waxay maira haygiacomo oneiltayyosi orona. So [facility] [phone].    ATENCIÓN: Si habla español, tiene a paredes disposición servicios gratuitos de asistencia lingüística. Derikame al [phone].    We comply with applicable federal civil rights laws and [place] laws. We do not discriminate on the basis of race, color, national origin, age, disability, sex, sexual orientation, or gender identity.            Thank you!     Thank you for choosing [facility]  for your care. Our goal is always to provide you with excellent care. Hearing back from our patients is one way we can continue to improve our services. Please take a few minutes to complete the written survey that you may receive in the mail after your visit with us. Thank you!             Your Updated Medication List - Protect others around you: Learn how to safely use, store and throw away your medicines at www.disposemymeds.org.          This list is accurate as of 1/24/18  4:59 PM.  Always use your most recent med list.                   Brand Name Dispense Instructions for use Diagnosis    clindamycin 1 % topical gel    CLINDAMAX    60 g    Apply topically 2 times daily    Other acne       cyclobenzaprine 10 MG tablet    FLEXERIL    30 tablet    Take 1 tablet (10 mg) by mouth 3 times daily as needed for muscle spasms    Neck pain       EPINEPHrine 0.3 MG/0.3ML injection 2-pack    EPIPEN 2-TRES    2 each    Inject 0.3 mLs (0.3 mg) into the muscle once as needed for anaphylaxis    Allergy to insect bites and stings       lactobacillus Tabs      Take 1 tablet by mouth daily.         LORazepam 0.5 MG tablet    ATIVAN    16 tablet    Take 1-2 tablets (0.5-1 mg) by mouth every 8 hours as needed for anxiety    Generalised anxiety disorder       QUEtiapine 50 MG tablet    SEROquel    90 tablet    Take 1 tablet (50 mg) by mouth At Bedtime    Major depressive disorder, recurrent episode, mild (H)       sertraline 100 MG tablet    ZOLOFT    90 tablet    Take 1 tablet (100 mg) by mouth daily    Major depressive disorder, recurrent episode, mild (H)

## 2018-01-24 NOTE — NURSING NOTE
"Chief Complaint   Patient presents with     Physical     Health Maintenance     pap, fit,  declines flu shot     Gastrophageal Reflux       Initial /76  Pulse 82  Temp 98.2  F (36.8  C) (Tympanic)  Resp 16  Ht 5' 4\" (1.626 m)  Wt 137 lb (62.1 kg)  SpO2 99%  Breastfeeding? No  BMI 23.52 kg/m2 Estimated body mass index is 23.52 kg/(m^2) as calculated from the following:    Height as of this encounter: 5' 4\" (1.626 m).    Weight as of this encounter: 137 lb (62.1 kg).  Medication Reconciliation: complete   ................Malick Yung LPN,   January 24, 2018,      4:36 PM,   [facility]    "

## 2018-01-24 NOTE — PROGRESS NOTES
SUBJECTIVE:   CC: Nilsa Chance is an 52 year old woman who presents for preventive health visit.     Healthy Habits:    Do you get at least three servings of calcium containing foods daily (dairy, green leafy vegetables, etc.)? yes    Amount of exercise or daily activities, outside of work: physically active lifestyle    Problems taking medications regularly No    Medication side effects: No    Have you had an eye exam in the past two years? yes    Do you see a dentist twice per year? no    Do you have sleep apnea, excessive snoring or daytime drowsiness?no    She is having vaginal dryness, decreased libido and pain with intercourse over the past few months.  Is having hot flashes as well.  S/p hysterectomy several years ago for endometriosis.  Ovaries and cervix were left in place.     Today's PHQ-2 Score:   PHQ-2 ( 1999 [employer]) 1/24/2018 10/9/2017   Q1: Little interest or pleasure in doing things 1 0   Q2: Feeling down, depressed or hopeless 1 0   PHQ-2 Score 2 0   Q1: Little interest or pleasure in doing things - Not at all   Q2: Feeling down, depressed or hopeless - Not at all   PHQ-2 Score - 0       Abuse: Current or Past(Physical, Sexual or Emotional)- No  Do you feel safe in your environment - Yes    Social History   Substance Use Topics     Smoking status: Current Every Day Smoker     Packs/day: 0.80     Years: 20.00     Types: Cigarettes     Smokeless tobacco: Never Used     Alcohol use No     If you drink alcohol do you typically have >3 drinks per day or >7 drinks per week? No                     Reviewed orders with patient.  Reviewed health maintenance and updated orders accordingly - Yes  BP Readings from Last 3 Encounters:   01/24/18 110/76   10/09/17 110/64   06/21/16 110/64    Wt Readings from Last 3 Encounters:   01/24/18 137 lb (62.1 kg)   10/09/17 141 lb 6.4 oz (64.1 kg)   06/21/16 139 lb 9.6 oz (63.3 kg)                  Patient Active Problem List   Diagnosis     Generalized anxiety  disorder     Psychophysiological insomnia     Menopausal syndrome (hot flashes)     CARDIOVASCULAR SCREENING; LDL GOAL LESS THAN 160     Arthritis of big toe-Left     Major depressive disorder, recurrent episode, mild (H)     Other acne     Bee sting allergy     Hyperlipidemia LDL goal <100     OAB (overactive bladder)     Tobacco use disorder     Past Surgical History:   Procedure Laterality Date     BACK SURGERY  2001,2003     C/SECTION, LOW TRANSVERSE      C-Sections x2     ORTHOPEDIC SURGERY      great toe R fused.     PARTIAL HYSTERECTOMY  2007    Menorrhagia, sparing cervix     TONSILLECTOMY & ADENOIDECTOMY         Social History   Substance Use Topics     Smoking status: Current Every Day Smoker     Packs/day: 0.80     Years: 20.00     Types: Cigarettes     Smokeless tobacco: Never Used     Alcohol use No     Family History   Problem Relation Age of Onset     Thyroid Disease Mother      goiter and then drained and put on synthroid.     DIABETES Paternal Grandmother          Current Outpatient Prescriptions   Medication Sig Dispense Refill     QUEtiapine (SEROQUEL) 50 MG tablet Take 1 tablet (50 mg) by mouth At Bedtime 90 tablet 1     sertraline (ZOLOFT) 100 MG tablet Take 1 tablet (100 mg) by mouth daily 90 tablet 1     EPINEPHrine (EPIPEN 2-TRES) 0.3 MG/0.3ML injection Inject 0.3 mLs (0.3 mg) into the muscle once as needed for anaphylaxis 2 each 1     clindamycin (CLINDAMAX) 1 % gel Apply topically 2 times daily 60 g 11     LORazepam (ATIVAN) 0.5 MG tablet Take 1-2 tablets (0.5-1 mg) by mouth every 8 hours as needed for anxiety 16 tablet 0     cyclobenzaprine (FLEXERIL) 10 MG tablet Take 1 tablet (10 mg) by mouth 3 times daily as needed for muscle spasms 30 tablet 0     Lactobacillus TABS Take 1 tablet by mouth daily.       [DISCONTINUED] QUEtiapine (SEROQUEL) 50 MG tablet TAKE ONE-HALF TO ONE TABLET BY MOUTH AT BEDTIME --NEED OFFICE VISIT 90 tablet 0     Allergies   Allergen Reactions     Codeine Sulfate  Hives     Paxil [Serotonin Reuptake Inhibitors]      Sleep problems, jittery       Patient over age 50, mutual decision to screen reflected in health maintenance.    Pertinent mammograms are reviewed under the imaging tab.  History of abnormal Pap smear: NO - age 30-65 PAP every 5 years with negative HPV co-testing recommended    Reviewed and updated as needed this visit by clinical staff  Tobacco  Allergies  Meds  Med Hx  Surg Hx  Fam Hx  Soc Hx        Reviewed and updated as needed this visit by Provider        Past Medical History:   Diagnosis Date     Anxiety      Bee sting allergy 4/26/2016     CARDIOVASCULAR SCREENING; LDL GOAL LESS THAN 160 6/29/2011     Depressive disorder      Generalised anxiety disorder 1/17/2011     Insomnia 1/17/2011     Menopausal syndrome (hot flashes) 3/8/2011     OAB (overactive bladder) 1/24/2017     Other acne 4/26/2016     Panic attacks       Past Surgical History:   Procedure Laterality Date     BACK SURGERY  2001,2003     C/SECTION, LOW TRANSVERSE      C-Sections x2     ORTHOPEDIC SURGERY      great toe R fused.     PARTIAL HYSTERECTOMY  2007    Menorrhagia, sparing cervix     TONSILLECTOMY & ADENOIDECTOMY         ROS:  C: NEGATIVE for fever, chills, change in weight  I: NEGATIVE for worrisome rashes, moles or lesions  E: NEGATIVE for vision changes or irritation  ENT: NEGATIVE for ear, mouth and throat problems  R: NEGATIVE for significant cough or SOB  B: NEGATIVE for masses, tenderness or discharge  CV: NEGATIVE for chest pain, palpitations or peripheral edema  GI: NEGATIVE for nausea, abdominal pain, heartburn, or change in bowel habits  : NEGATIVE for unusual urinary or vaginal symptoms. No vaginal bleeding.  POSITIVE for vaginal dryness and pain with intercourse as above   M: NEGATIVE for significant arthralgias or myalgia  N: NEGATIVE for weakness, dizziness or paresthesias  P: NEGATIVE for changes in mood or affect     OBJECTIVE:   /76  Pulse 82  Temp  "98.2  F (36.8  C) (Tympanic)  Resp 16  Ht 5' 4\" (1.626 m)  Wt 137 lb (62.1 kg)  SpO2 99%  Breastfeeding? No  BMI 23.52 kg/m2  EXAM:  GENERAL APPEARANCE: healthy, alert and no distress  EYES: Eyes grossly normal to inspection, PERRL and conjunctivae and sclerae normal  HENT: ear canals and TM's normal, nose and mouth without ulcers or lesions, oropharynx clear and oral mucous membranes moist  NECK: no adenopathy, no asymmetry, masses, or scars and thyroid normal to palpation  RESP: lungs clear to auscultation - no rales, rhonchi or wheezes  BREAST: normal without masses, tenderness or nipple discharge and no palpable axillary masses or adenopathy  CV: regular rate and rhythm, normal S1 S2, no S3 or S4, no murmur, click or rub, no peripheral edema and peripheral pulses strong  ABDOMEN: soft, nontender, no hepatosplenomegaly, no masses and bowel sounds normal   (female): normal female external genitalia, normal urethral meatus, vaginal mucosal atrophy noted, normal cervix, she has significant pain with speculum examination.   MS: no musculoskeletal defects are noted and gait is age appropriate without ataxia  SKIN: no suspicious lesions or rashes  NEURO: Normal strength and tone, sensory exam grossly normal, mentation intact and speech normal  PSYCH: mentation appears normal and affect normal/bright    ASSESSMENT/PLAN:   1. Routine general medical examination at a health care facility    2. Screening for cervical cancer  - Pap imaged thin layer screen with HPV - recommended age 30 - 65  - HPV High Risk Types DNA Cervical    3. Screening for diabetes mellitus  - Glucose; Future    4. CARDIOVASCULAR SCREENING; LDL GOAL LESS THAN 160  - Lipid panel reflex to direct LDL Fasting; Future    5. Dyspareunia in female  Referral to OB/GYN for further evaluation   - OB/GYN REFERRAL    6. Vaginal atrophy  - OB/GYN REFERRAL    7. Decreased sex drive  - OB/GYN REFERRAL    COUNSELING:   Reviewed preventive health counseling, " "as reflected in patient instructions       Regular exercise       Healthy diet/nutrition       (Katelyn)menopause management         reports that she has been smoking Cigarettes.  She has a 16.00 pack-year smoking history. She has never used smokeless tobacco.  Tobacco Cessation Action Plan: Information offered: Patient not interested at this time  Estimated body mass index is 23.52 kg/(m^2) as calculated from the following:    Height as of this encounter: 5' 4\" (1.626 m).    Weight as of this encounter: 137 lb (62.1 kg).       Counseling Resources:  ATP IV Guidelines  Pooled Cohorts Equation Calculator  Breast Cancer Risk Calculator  FRAX Risk Assessment  ICSI Preventive Guidelines  Dietary Guidelines for Americans, 2010  USDA's MyPlate  ASA Prophylaxis  Lung CA Screening    [facility]  "

## 2018-01-25 PROCEDURE — 82274 ASSAY TEST FOR BLOOD FECAL: CPT | Performed by: PHYSICIAN ASSISTANT

## 2018-01-26 DIAGNOSIS — Z12.11 SPECIAL SCREENING FOR MALIGNANT NEOPLASMS, COLON: ICD-10-CM

## 2018-01-28 LAB — HEMOCCULT STL QL IA: NEGATIVE

## 2018-01-29 LAB
COPATH REPORT: NORMAL
PAP: NORMAL

## 2018-01-30 DIAGNOSIS — Z13.6 CARDIOVASCULAR SCREENING; LDL GOAL LESS THAN 160: ICD-10-CM

## 2018-01-30 DIAGNOSIS — Z13.1 SCREENING FOR DIABETES MELLITUS: ICD-10-CM

## 2018-01-30 LAB
CHOLEST SERPL-MCNC: 220 MG/DL
GLUCOSE SERPL-MCNC: 88 MG/DL (ref 70–99)
HDLC SERPL-MCNC: 64 MG/DL
LDLC SERPL CALC-MCNC: 141 MG/DL
NONHDLC SERPL-MCNC: 156 MG/DL
TRIGL SERPL-MCNC: 73 MG/DL

## 2018-01-30 PROCEDURE — 36415 COLL VENOUS BLD VENIPUNCTURE: CPT | Performed by: NURSE PRACTITIONER

## 2018-01-30 PROCEDURE — 80061 LIPID PANEL: CPT | Performed by: NURSE PRACTITIONER

## 2018-01-30 PROCEDURE — 82947 ASSAY GLUCOSE BLOOD QUANT: CPT | Performed by: NURSE PRACTITIONER

## 2018-01-30 NOTE — LETTER
2018      Nilsa Chance  [address] [place] [id number]        Dear ,    We are writing to inform you of your test results.    Your labs showed the followin.  Your glucose was normal, no sign of diabetes.   2. Your cholesterol levels came back elevated.  This can put you at increased risk for heart attacks and strokes.  At this time, you do not need medications, but I would like you to watch your diet and increase your exercise.  We will recheck these levels in 1 year.  I have included some information about ways to decrease your cholesterol.               How can I control my cholesterol level?   High cholesterol may run in families. Know your family history and discuss it with your healthcare provider.   You can often control cholesterol levels by   eating right   exercising   not smoking   losing weight if you are overweight.   If you have a high risk for heart disease, your healthcare provider may prescribe cholesterol-lowering medicine as well as changes in lifestyle.   Eat right.   To control the cholesterol and types and amounts of fat you eat:   Check food labels for fat and cholesterol content. Choose the foods with less fat per serving.   Limit the amount of butter and margarine you eat.   Use olive, canola, sunflower, safflower, soybean, or corn oil. Avoid tropical oils such as palm or coconut oil. Also avoid oils that have been hydrogenated or partially hydrogenated.   Use salad dressings and margarine made with polyunsaturated and monounsaturated fats.   Use egg whites or egg substitutes rather than whole eggs.   Replace whole-milk dairy products with nonfat or low-fat milk, cheese, spreads, and yogurt.   Eat skinless chicken, turkey, fish, and meatless entrees more often than red meat.   Choose lean cuts of meat and trim off all visible fat. Keep portion sizes moderate.   Avoid fatty desserts such as ice cream, cream-filled cakes, and cheesecakes. Choose fresh  "fruits, nonfat frozen yogurt, Popsicles, etc.   Reduce the amount of fried foods, vending machine food, and fast food you eat.   Eat several daily servings of fruits and vegetables (especially fresh fruits and leafy vegetables), beans, and whole grains (such as whole wheat, bran, brown rice, oats, and oatmeal). The fiber in these foods helps lower cholesterol.   Eat 4 to 5 servings of nuts a week. Examples of nuts that can be a part of a healthy diet are walnuts, almonds, hazelnuts, peanuts, pecans, and pistachio nuts.   Look for low-fat or nonfat varieties of the foods you like to eat, or look for substitutes.   Exercise.   Exercise goes hand-in-hand with a healthy diet for controlling cholesterol. Exercise helps because it:   Keeps your weight down.   Decreases your total cholesterol level.   Decreases your LDL (bad cholesterol).   Increases your HDL (good cholesterol).   A good exercise program includes aerobic exercise. Aerobic exercise is any activity that keeps your heart rate up (such as swimming, jogging, walking, and bicycling). You should get at least 30 minutes of moderate aerobic exercise most days of the week. Moderate aerobic exercise is generally defined as requiring the energy it takes to walk 2 miles in 30 minutes. You may need to exercise 60 minutes a day to prevent weight gain and 90 minutes a day to lose weight.   If you haven't been exercising, ask your healthcare provider for an exercise prescription and start your new exercise program slowly.   Don't smoke.   Do not smoke. Smoking increases your risk of heart disease because it lowers HDL levels, increases your risk of blood clots, and decreases oxygen to the tissues.   Lose excess weight.   Extra weight increases your risk for heart and blood vessel disease. One way it does this is by causing your LDL (\"bad\") cholesterol to go up. Extra weight can also make you tired. It takes a lot of energy to carry all those pounds around. The result is " "that you are less active. This can mean that you don't get enough exercise and gain even more weight.   Losing excess weight:   Improves not only the bad LDL cholesterol but other blood fats as well.   Lowers your risk for heart attack or stroke.   Increases your energy and helps you feel better (both physically and mentally) and become more active.   Your weight is primarily the result of 2 factors. One is the number of calories you consume. The other is the number of calories you \"burn\". If you eat more calories than you use, your body will store the extra calories as fat and your weight will go up. If your body uses more calories than you eat, you will lose weight.   Here are some things you can do to lose weight.   Talk to your healthcare provider about your weight. Ask how a change in diet and exercise will change your cholesterol levels. Plan for gradual weight loss, just 1 or 2 pounds per week   Eat fewer calories.   Get more physical activity.   Keep a diary of your food and exercise for a couple of weeks to become more aware of your habits.     In summary, changes that you can make in your lifestyle to control your cholesterol level are:   Eat healthy.   Get regular exercise.   Don't smoke.   Keep a healthy weight.   Have your cholesterol levels checked as often as your provider recommends.     Sincerely,      Leanne Fitzpatrick APRN CNP      MARCIE/anival,CYNTHIAN   "

## 2018-01-31 LAB
FINAL DIAGNOSIS: NORMAL
HPV HR 12 DNA CVX QL NAA+PROBE: NEGATIVE
HPV16 DNA SPEC QL NAA+PROBE: NEGATIVE
HPV18 DNA SPEC QL NAA+PROBE: NEGATIVE
SPECIMEN DESCRIPTION: NORMAL
SPECIMEN SOURCE CVX/VAG CYTO: NORMAL

## 2018-01-31 NOTE — PROGRESS NOTES
Letter sent to patient informing of glucose and lipids.  ................Malick Yung LPN,   January 31, 2018,      11:46 AM,   [facility]

## 2018-04-06 ENCOUNTER — OFFICE VISIT (OUTPATIENT)
Dept: FAMILY MEDICINE | Facility: OTHER | Age: 53
End: 2018-04-06
Payer: COMMERCIAL

## 2018-04-06 VITALS
DIASTOLIC BLOOD PRESSURE: 70 MMHG | OXYGEN SATURATION: 97 % | SYSTOLIC BLOOD PRESSURE: 124 MMHG | TEMPERATURE: 98 F | RESPIRATION RATE: 20 BRPM | HEART RATE: 94 BPM

## 2018-04-06 DIAGNOSIS — M25.562 ACUTE PAIN OF LEFT KNEE: Primary | ICD-10-CM

## 2018-04-06 PROCEDURE — 99213 OFFICE O/P EST LOW 20 MIN: CPT | Performed by: NURSE PRACTITIONER

## 2018-04-06 RX ORDER — HYDROCODONE BITARTRATE AND ACETAMINOPHEN 5; 325 MG/1; MG/1
1-2 TABLET ORAL EVERY 4 HOURS PRN
Qty: 20 TABLET | Refills: 0 | Status: SHIPPED | OUTPATIENT
Start: 2018-04-06 | End: 2018-05-15

## 2018-04-06 RX ORDER — METHYLPREDNISOLONE 4 MG
TABLET, DOSE PACK ORAL
Qty: 21 TABLET | Refills: 0 | Status: SHIPPED | OUTPATIENT
Start: 2018-04-06 | End: 2018-05-15

## 2018-04-06 ASSESSMENT — PAIN SCALES - GENERAL: PAINLEVEL: EXTREME PAIN (8)

## 2018-04-06 NOTE — MR AVS SNAPSHOT
After Visit Summary   4/6/2018    Nilsa Chance    MRN: [id number]           Patient Information     Date Of Birth          1965        Visit Information        Provider Department      4/6/2018 2:40 PM Leanne Fitzpatrick APRN CNP [facility]        Today's Diagnoses     Acute pain of left knee    -  1      Care Instructions    See sports medicine in [place].     Use the Norco as needed for pain.     Take the Medrol dose pack as prescribed.  Take with food.             Follow-ups after your visit        Additional Services     SPORTS MEDICINE REFERRAL       Your provider has referred you to:  [facility]: [facility] [phone]   [email or web address]/    Please be aware that coverage of these services is subject to the terms and limitations of your health insurance plan.  Call member services at your health plan with any benefit or coverage questions.      Please bring the following to your appointment:    >>   Any x-rays, CTs or MRIs which have been performed.  Contact the facility where they were done to arrange for  prior to your scheduled appointment.    >>   List of current medications   >>   This referral request   >>   Any documents/labs given to you for this referral                  Who to contact     If you have questions or need follow up information about today's clinic visit or your schedule please contact [facility] directly at [phone].  Normal or non-critical lab and imaging results will be communicated to you by MyChart, letter or phone within 4 business days after the clinic has received the results. If you do not hear from us within 7 days, please contact the clinic through MyChart or phone. If you have a critical or abnormal lab result, we will notify you by phone as soon as possible.  Submit refill requests through [employer] or call your pharmacy and they will forward the refill request to  us. Please allow 3 business days for your refill to be completed.          Additional Information About Your Visit        MyChart Information     [employer] gives you secure access to your electronic health record. If you see a primary care provider, you can also send messages to your care team and make appointments. If you have questions, please call your primary care clinic.  If you do not have a primary care provider, please call [phone] and they will assist you.        Care EveryWhere ID     This is your Care EveryWhere ID. This could be used by other organizations to access your [place] medical records  [id number]        Your Vitals Were     Pulse Temperature Respirations Pulse Oximetry Breastfeeding?       94 98  F (36.7  C) (Tympanic) 20 97% No        Blood Pressure from Last 3 Encounters:   04/06/18 124/70   01/24/18 110/76   10/09/17 110/64    Weight from Last 3 Encounters:   01/24/18 137 lb (62.1 kg)   10/09/17 141 lb 6.4 oz (64.1 kg)   06/21/16 139 lb 9.6 oz (63.3 kg)              We Performed the Following     SPORTS MEDICINE REFERRAL          Today's Medication Changes          These changes are accurate as of 4/6/18  3:22 PM.  If you have any questions, ask your nurse or doctor.               Start taking these medicines.        Dose/Directions    HYDROcodone-acetaminophen 5-325 MG per tablet   Commonly known as:  NORCO   Used for:  Acute pain of left knee   Started by:  Leanne Fitzpatrick APRN CNP        Dose:  1-2 tablet   Take 1-2 tablets by mouth every 4 hours as needed for pain maximum 10 tablet(s) per day   Quantity:  20 tablet   Refills:  0       methylPREDNISolone 4 MG tablet   Commonly known as:  MEDROL DOSEPAK   Used for:  Acute pain of left knee   Started by:  Leanne Fitzpatrick APRN CNP        Follow package instructions   Quantity:  21 tablet   Refills:  0            Where to get your medicines      These medications were sent to [facility] [place], [place] - [address]   [address] [facility] [address]     Phone:  [phone]     methylPREDNISolone 4 MG tablet         Some of these will need a paper prescription and others can be bought over the counter.  Ask your nurse if you have questions.     Bring a paper prescription for each of these medications     HYDROcodone-acetaminophen 5-325 MG per tablet                Primary Care Provider Fax #    Physician No Ref-Primary [phone]       No address on file        Equal Access to Services     SETH ABBASI : Hadii aad ku hadasho Soomaali, waaxda luqadaha, qaybta kaalmada adeegyada, waxay robin willyn belindaryan clark lajazmin orona. So [facility] [phone].    ATENCIÓN: Si delgado cummins, tiene a paredes disposición servicios gratuitos de asistencia lingüística. Llame al [phone].    We comply with applicable federal civil rights laws and [place] laws. We do not discriminate on the basis of race, color, national origin, age, disability, sex, sexual orientation, or gender identity.            Thank you!     Thank you for choosing [facility]  for your care. Our goal is always to provide you with excellent care. Hearing back from our patients is one way we can continue to improve our services. Please take a few minutes to complete the written survey that you may receive in the mail after your visit with us. Thank you!             Your Updated Medication List - Protect others around you: Learn how to safely use, store and throw away your medicines at www.disposemymeds.org.          This list is accurate as of 4/6/18  3:22 PM.  Always use your most recent med list.                   Brand Name Dispense Instructions for use Diagnosis    clindamycin 1 % topical gel    CLINDAMAX    60 g    Apply topically 2 times daily    Other acne       EPINEPHrine 0.3 MG/0.3ML injection 2-pack    EPIPEN 2-TRES    2 each    Inject 0.3 mLs (0.3 mg) into the muscle once as needed for anaphylaxis    Allergy to insect bites and stings        HYDROcodone-acetaminophen 5-325 MG per tablet    NORCO    20 tablet    Take 1-2 tablets by mouth every 4 hours as needed for pain maximum 10 tablet(s) per day    Acute pain of left knee       lactobacillus Tabs      Take 1 tablet by mouth daily.        LORazepam 0.5 MG tablet    ATIVAN    16 tablet    Take 1-2 tablets (0.5-1 mg) by mouth every 8 hours as needed for anxiety    Generalised anxiety disorder       methylPREDNISolone 4 MG tablet    MEDROL DOSEPAK    21 tablet    Follow package instructions    Acute pain of left knee       QUEtiapine 50 MG tablet    SEROquel    90 tablet    Take 1 tablet (50 mg) by mouth At Bedtime    Major depressive disorder, recurrent episode, mild (H)       sertraline 100 MG tablet    ZOLOFT    30 tablet    TAKE ONE TABLET BY MOUTH EVERY DAY    Major depressive disorder, recurrent episode, mild (H)

## 2018-04-06 NOTE — NURSING NOTE
"Chief Complaint   Patient presents with     Knee Pain     x3 weeks       Initial /70  Pulse 94  Temp 98  F (36.7  C) (Tympanic)  Resp 20  SpO2 97%  Breastfeeding? No Estimated body mass index is 23.52 kg/(m^2) as calculated from the following:    Height as of 1/24/18: 5' 4\" (1.626 m).    Weight as of 1/24/18: 137 lb (62.1 kg).  Medication Reconciliation: complete   ................Malick Yung LPN,   April 6, 2018,      3:03 PM,   [facility]     "

## 2018-04-06 NOTE — PATIENT INSTRUCTIONS
See sports medicine in [place].     Use the Norco as needed for pain.     Take the Medrol dose pack as prescribed.  Take with food.

## 2018-04-09 ENCOUNTER — HOSPITAL ENCOUNTER (EMERGENCY)
Facility: CLINIC | Age: 53
Discharge: HOME | End: 2018-04-09
Attending: EMERGENCY MEDICINE | Admitting: EMERGENCY MEDICINE
Payer: COMMERCIAL

## 2018-04-09 ENCOUNTER — APPOINTMENT (OUTPATIENT)
Dept: GENERAL RADIOLOGY | Facility: CLINIC | Age: 53
End: 2018-04-09
Attending: EMERGENCY MEDICINE
Payer: COMMERCIAL

## 2018-04-09 VITALS
TEMPERATURE: 98 F | DIASTOLIC BLOOD PRESSURE: 71 MMHG | OXYGEN SATURATION: 98 % | RESPIRATION RATE: 14 BRPM | SYSTOLIC BLOOD PRESSURE: 112 MMHG

## 2018-04-09 DIAGNOSIS — M25.562 ACUTE PAIN OF LEFT KNEE: ICD-10-CM

## 2018-04-09 PROCEDURE — 96374 THER/PROPH/DIAG INJ IV PUSH: CPT | Performed by: EMERGENCY MEDICINE

## 2018-04-09 PROCEDURE — 99285 EMERGENCY DEPT VISIT HI MDM: CPT | Mod: Z6 | Performed by: EMERGENCY MEDICINE

## 2018-04-09 PROCEDURE — 99285 EMERGENCY DEPT VISIT HI MDM: CPT | Mod: 25 | Performed by: EMERGENCY MEDICINE

## 2018-04-09 PROCEDURE — 29505 APPLICATION LONG LEG SPLINT: CPT | Mod: LT | Performed by: EMERGENCY MEDICINE

## 2018-04-09 PROCEDURE — 25000128 H RX IP 250 OP 636: Performed by: EMERGENCY MEDICINE

## 2018-04-09 PROCEDURE — 73562 X-RAY EXAM OF KNEE 3: CPT | Mod: TC,LT

## 2018-04-09 RX ORDER — HYDROMORPHONE HYDROCHLORIDE 2 MG/1
2-4 TABLET ORAL EVERY 6 HOURS PRN
Qty: 20 TABLET | Refills: 0 | Status: SHIPPED | OUTPATIENT
Start: 2018-04-09 | End: 2018-05-15

## 2018-04-09 RX ADMIN — HYDROMORPHONE HYDROCHLORIDE 1 MG: 1 INJECTION, SOLUTION INTRAMUSCULAR; INTRAVENOUS; SUBCUTANEOUS at 16:33

## 2018-04-09 NOTE — ED AVS SNAPSHOT
[facility] Emergency Department    [address] [place] [address]    Phone:  [phone]    Fax:  [phone]                                       Nilsa Chance   MRN: [id number]    Department:  [facility] Emergency Department   Date of Visit:  4/9/2018           After Visit Summary Signature Page     I have received my discharge instructions, and my questions have been answered. I have discussed any challenges I see with this plan with the nurse or doctor.    ..........................................................................................................................................  Patient/Patient Representative Signature      ..........................................................................................................................................  Patient Representative Print Name and Relationship to Patient    ..................................................               ................................................  Date                                            Time    ..........................................................................................................................................  Reviewed by Signature/Title    ...................................................              ..............................................  Date                                                            Time

## 2018-04-09 NOTE — ED PROVIDER NOTES
History     Chief Complaint   Patient presents with     Knee Pain     HPI  Nilsa Chance is a 52 year old female who presents to the emergency department with concerns of knee pain. Patient states that this started 1 month ago. It has been progressively getting worse and this past week and a half it has been significantly worse. She is unable to pin point the worse area of the pain, but states that the whole knee hurts.   Today she was walking down the stairs when she had the sudden onset of worsening pain and felt a pop in the posterior aspect of the lateral region of her left knee.  Pain at this point is severe and unrelieved with Vicodin.    Problem List:    Patient Active Problem List    Diagnosis Date Noted     H/O abdominal supracervical subtotal hysterectomy      Priority: Medium     2007 supracervical hysterectomy due to endometriosis.   2/3/10 NIL pap  9/13/11 NIL pap  1/24/18 NIL pap, neg HR HPV. Plan: cotest in 5 years.       Tobacco use disorder 10/09/2017     Priority: Medium     OAB (overactive bladder) 01/24/2017     Priority: Medium     Hyperlipidemia LDL goal <100 09/14/2016     Priority: Medium     Other acne 04/26/2016     Priority: Medium     Bee sting allergy 04/26/2016     Priority: Medium     Major depressive disorder, recurrent episode, mild (H) 09/29/2011     Priority: Medium     Arthritis of big toe-Left 09/13/2011     Priority: Medium     CARDIOVASCULAR SCREENING; LDL GOAL LESS THAN 160 06/29/2011     Priority: Medium     Menopausal syndrome (hot flashes) 03/08/2011     Priority: Medium     Generalized anxiety disorder 01/17/2011     Priority: Medium     Diagnosis updated by automated process. Provider to review and confirm.       Psychophysiological insomnia 01/17/2011     Priority: Medium        Past Medical History:    Past Medical History:   Diagnosis Date     Anxiety      Bee sting allergy 4/26/2016     CARDIOVASCULAR SCREENING; LDL GOAL LESS THAN 160 6/29/2011     Depressive  disorder      Generalised anxiety disorder 1/17/2011     Insomnia 1/17/2011     Menopausal syndrome (hot flashes) 3/8/2011     OAB (overactive bladder) 1/24/2017     Other acne 4/26/2016     Panic attacks        Past Surgical History:    Past Surgical History:   Procedure Laterality Date     BACK SURGERY  2001,2003     C/SECTION, LOW TRANSVERSE      C-Sections x2     ORTHOPEDIC SURGERY      great toe R fused.     PARTIAL HYSTERECTOMY  2007    Menorrhagia, sparing cervix     TONSILLECTOMY & ADENOIDECTOMY         Family History:    Family History   Problem Relation Age of Onset     Thyroid Disease Mother      goiter and then drained and put on synthroid.     DIABETES Paternal Grandmother        Social History:  Marital Status:   [2]  Social History   Substance Use Topics     Smoking status: Current Every Day Smoker     Packs/day: 0.80     Years: 20.00     Types: Cigarettes     Smokeless tobacco: Never Used     Alcohol use No        Medications:      HYDROmorphone (DILAUDID) 2 MG tablet   HYDROcodone-acetaminophen (NORCO) 5-325 MG per tablet   methylPREDNISolone (MEDROL DOSEPAK) 4 MG tablet   sertraline (ZOLOFT) 100 MG tablet   QUEtiapine (SEROQUEL) 50 MG tablet   EPINEPHrine (EPIPEN 2-TRES) 0.3 MG/0.3ML injection   clindamycin (CLINDAMAX) 1 % gel   LORazepam (ATIVAN) 0.5 MG tablet   Lactobacillus TABS         Review of Systems   Musculoskeletal:        Patient has left knee pain.   All other systems reviewed and are negative.      Physical Exam   BP: 112/71  Heart Rate: 71  Temp: 98  F (36.7  C)  Resp: 14  SpO2: 98 %      Physical Exam   Constitutional: She appears well-developed and well-nourished.   HENT:   Head: Normocephalic and atraumatic.   Eyes: Conjunctivae are normal. Pupils are equal, round, and reactive to light.   Neck: Normal range of motion. Neck supple.   Cardiovascular: Normal rate, regular rhythm and normal heart sounds.    Pulmonary/Chest: Effort normal and breath sounds normal. She has no  wheezes. She has no rales.   Abdominal: Soft. Bowel sounds are normal. There is no tenderness. There is no rebound and no guarding.   Musculoskeletal: She exhibits no deformity.   Patient is holding her left knee at an angle. Any movement of the knee causes significant pain. There is no deformity, swelling, or ecchymosis.  Distal CMS to the leg is intact.  After pain medication, there is tenderness in the posterior aspect of the lateral joint line of the knee.   Neurological: She is alert. No cranial nerve deficit.   Skin: Skin is warm and dry.   Psychiatric: She has a normal mood and affect. Her behavior is normal.       ED Course     ED Course     Procedures               Critical Care time:  none               Results for orders placed or performed during the hospital encounter of 04/09/18 (from the past 24 hour(s))   XR Knee Left 3 Views    Narrative    LEFT KNEE THREE VIEWS  4/9/2018 4:51 PM     HISTORY: Pain.    COMPARISON: 8/2/2011.    FINDINGS: There is no significant degenerative change. No  suprapatellar effusion. There is no acute fracture. No dislocation.   There are no worrisome bony lesions.      Impression    IMPRESSION:  No acute osseous abnormality demonstrated.    SRI BEYER MD       Medications   HYDROmorphone (DILAUDID) injection 1 mg (1 mg Intramuscular Given 4/9/18 1633)       Assessments & Plan (with Medical Decision Making)  52-year-old female with significant left knee pain over the last month.  Worse today.  X-rays negative.  Concern would be for a degenerative type of tear to the meniscus.  She is placed in a knee immobilizer.  She is given a single shot of IM Dilaudid with relief.  She is prescribed hydromorphone below as needed for pain.  She has an appointment tomorrow with sports medicine she is planning to keep.  Advanced imaging can be discussed at that point.     I have reviewed the nursing notes.    I have reviewed the findings, diagnosis, plan and need for follow up with the  patient.       Discharge Medication List as of 4/9/2018  6:12 PM      START taking these medications    Details   HYDROmorphone (DILAUDID) 2 MG tablet Take 1-2 tablets (2-4 mg) by mouth every 6 hours as needed for moderate to severe pain, Disp-20 tablet, R-0, Local Print             Final diagnoses:   Acute pain of left knee     This document serves as a record of services personally performed by Jefe Jonas MD. It was created on their behalf by Sasha Paula, a trained medical scribe. The creation of this record is based on the provider's personal observations and the statements of the patient. This document has been checked and approved by the attending provider.  Note: Chart documentation done in part with Dragon Voice Recognition software. Although reviewed after completion, some word and grammatical errors may remain.  4/9/2018   [facility] EMERGENCY DEPARTMENT     Jefe Jonas MD  04/09/18 0373

## 2018-04-09 NOTE — ED NOTES
Here with left knee pain. States she is suppose to see a sports medicine doctor tomorrow as a follow up from her clinic visit. Today her knee bucked today and she developed intolerable pain

## 2018-04-09 NOTE — ED AVS SNAPSHOT
[facility] Emergency Department    [address]     [place] [address]    Phone:  [phone]    Fax:  [phone]                                       Nilsa Chance   MRN: [id number]    Department:  [facility] Emergency Department   Date of Visit:  4/9/2018           Patient Information     Date Of Birth          1965        Your diagnoses for this visit were:     Acute pain of left knee        You were seen by Jefe Jonas MD.      Follow-up Information     Follow up with Sammi Sheridan MD In 1 day.    Specialty:  Family Medicine - Sports Medicine    Contact information:    [address]  [address]  [place] [address]  [phone]          Discharge Instructions         Knee Pain of Uncertain Cause    There are several common causes for knee pain. These can include:    A sprain of the ligaments that support the joint    An injury to the cartilage lining of the joint    Arthritis from wear-and-tear or inflammation  There are other causes as well. There may also be swelling, reduced movement of the knee joint, and pain with walking. A definite diagnosis will still need to be made. If your symptoms do not improve, further follow-up and testing may be needed.  Home care    Stay off the injured leg as much as possible until pain improves.    Apply an ice pack over the injured area for 15 to 20 minutes every 3 to 6 hours. You should do this for the first 24 to 48 hours. You can make an ice pack by filling a plastic bag that seals at the top with ice cubes and then wrapping it with a thin towel. Continue to use ice packs for relief of pain and swelling as needed. As the ice melts, be careful to avoid getting your wrap, splint, or cast wet. After 48 hours, apply heat (warm shower or warm bath) for 15 to 20 minutes several times a day, or alternate ice and heat. If you have to wear a hook-and-loop knee brace, you can open it to apply the ice pack,  or heat, directly to the knee. Never put ice directly on the skin. Always wrap the ice in a towel or other type of cloth.    You may use over-the-counter pain medicine to control pain, unless another pain medicine was prescribed. If you have chronic liver or kidney disease or ever had a stomach ulcer or GI bleeding, talk with your healthcare provider using these medicines.    If crutches or a walker have been recommended, do not put weight on the injured leg until you can do so without pain. Check with your healthcare provider before returning to sports or full work duties.    If you have a hook-and-loop knee brace, you can remove it to bathe and sleep, unless told otherwise.  Follow-up care  Follow up with your healthcare provider as advised. This is usually within 1-2 weeks.  If X-rays were taken, you will be told of any new findings that may affect your care.  Call 911  Call 911 if you have:    Shortness of breath    Chest pain  When to seek medical advice  Call your healthcare provider right away if any of these occur:    Toes or foot becomes swollen, cold, blue, numb, or tingly    Pain or swelling spreads over the knee or calf    Warmth or redness appears over the knee or calf    Other joints become painful    Rash appears    Fever of 100.4 F (38 C) or above lasting for 24 to 48 hours  Date Last Reviewed: 11/23/2015 2000-2017 [employer]. [address] [place] [address]. All rights reserved. This information is not intended as a substitute for professional medical care. Always follow your healthcare professional's instructions.      Crutches and knee immobilizer as needed.       Your next 10 appointments already scheduled     Apr 10, 2018  1:00 PM CDT   New Visit with Sammi Sheridan MD   [facility]    [address] [place] [id number] [phone]              24 Hour Appointment Hotline       To make an appointment  at any [facility] clinic, call [phone] ([phone]). If you don't have a family doctor or clinic, we will help you find one. [facility] clinics are conveniently located to serve the needs of you and your family.          ED Discharge Orders     Knee immobilizer                    Review of your medicines      START taking        Dose / Directions Last dose taken    HYDROmorphone 2 MG tablet   Commonly known as:  DILAUDID   Dose:  2-4 mg   Quantity:  20 tablet        Take 1-2 tablets (2-4 mg) by mouth every 6 hours as needed for moderate to severe pain   Refills:  0          Our records show that you are taking the medicines listed below. If these are incorrect, please call your family doctor or clinic.        Dose / Directions Last dose taken    clindamycin 1 % topical gel   Commonly known as:  CLINDAMAX   Quantity:  60 g        Apply topically 2 times daily   Refills:  11        EPINEPHrine 0.3 MG/0.3ML injection 2-pack   Commonly known as:  EPIPEN 2-TRES   Dose:  0.3 mg   Quantity:  2 each        Inject 0.3 mLs (0.3 mg) into the muscle once as needed for anaphylaxis   Refills:  1        HYDROcodone-acetaminophen 5-325 MG per tablet   Commonly known as:  NORCO   Dose:  1-2 tablet   Quantity:  20 tablet        Take 1-2 tablets by mouth every 4 hours as needed for pain maximum 10 tablet(s) per day   Refills:  0        lactobacillus Tabs   Dose:  1 tablet        Take 1 tablet by mouth daily.   Refills:  0        LORazepam 0.5 MG tablet   Commonly known as:  ATIVAN   Dose:  0.5-1 mg   Quantity:  16 tablet        Take 1-2 tablets (0.5-1 mg) by mouth every 8 hours as needed for anxiety   Refills:  0        methylPREDNISolone 4 MG tablet   Commonly known as:  MEDROL DOSEPAK   Quantity:  21 tablet        Follow package instructions   Refills:  0        QUEtiapine 50 MG tablet   Commonly known as:  SEROquel   Dose:  50 mg   Quantity:  90 tablet        Take 1 tablet (50 mg) by mouth At Bedtime   Refills:  1         sertraline 100 MG tablet   Commonly known as:  ZOLOFT   Quantity:  30 tablet        TAKE ONE TABLET BY MOUTH EVERY DAY   Refills:  0                Prescriptions were sent or printed at these locations (1 Prescription)                   Other Prescriptions                Printed at Department/Unit printer (1 of 1)         HYDROmorphone (DILAUDID) 2 MG tablet                Procedures and tests performed during your visit     XR Knee Left 3 Views      Orders Needing Specimen Collection     None      Pending Results     No orders found from 4/7/2018 to 4/10/2018.            Pending Culture Results     No orders found from 4/7/2018 to 4/10/2018.            Pending Results Instructions     If you had any lab results that were not finalized at the time of your Discharge, you can call the ED Lab Result RN at [phone]. You will be contacted by this team for any positive Lab results or changes in treatment. The nurses are available 7 days a week from 10A to 6:30P.  You can leave a message 24 hours per day and they will return your call.        Thank you for choosing [facility]       Thank you for choosing [facility] for your care. Our goal is always to provide you with excellent care. Hearing back from our patients is one way we can continue to improve our services. Please take a few minutes to complete the written survey that you may receive in the mail after you visit with us. Thank you!        [employer] Information     [employer] gives you secure access to your electronic health record. If you see a primary care provider, you can also send messages to your care team and make appointments. If you have questions, please call your primary care clinic.  If you do not have a primary care provider, please call [phone] and they will assist you.        Care EveryWhere ID     This is your Care EveryWhere ID. This could be used by other organizations to access your [facility] medical records  [id number]        Equal Access to  Services     [facility]: Leilani Jackson, wamarthada luqadaha, [address] [place]. So [facility] [phone].    ATENCIÓN: Si habla español, tiene a paredes disposición servicios gratuitos de asistencia lingüística. Llame al [phone].    We comply with applicable federal civil rights laws and [place] laws. We do not discriminate on the basis of race, color, national origin, age, disability, sex, sexual orientation, or gender identity.            After Visit Summary       This is your record. Keep this with you and show to your community pharmacist(s) and doctor(s) at your next visit.

## 2018-04-09 NOTE — DISCHARGE INSTRUCTIONS
Knee Pain of Uncertain Cause    There are several common causes for knee pain. These can include:    A sprain of the ligaments that support the joint    An injury to the cartilage lining of the joint    Arthritis from wear-and-tear or inflammation  There are other causes as well. There may also be swelling, reduced movement of the knee joint, and pain with walking. A definite diagnosis will still need to be made. If your symptoms do not improve, further follow-up and testing may be needed.  Home care    Stay off the injured leg as much as possible until pain improves.    Apply an ice pack over the injured area for 15 to 20 minutes every 3 to 6 hours. You should do this for the first 24 to 48 hours. You can make an ice pack by filling a plastic bag that seals at the top with ice cubes and then wrapping it with a thin towel. Continue to use ice packs for relief of pain and swelling as needed. As the ice melts, be careful to avoid getting your wrap, splint, or cast wet. After 48 hours, apply heat (warm shower or warm bath) for 15 to 20 minutes several times a day, or alternate ice and heat. If you have to wear a hook-and-loop knee brace, you can open it to apply the ice pack, or heat, directly to the knee. Never put ice directly on the skin. Always wrap the ice in a towel or other type of cloth.    You may use over-the-counter pain medicine to control pain, unless another pain medicine was prescribed. If you have chronic liver or kidney disease or ever had a stomach ulcer or GI bleeding, talk with your healthcare provider using these medicines.    If crutches or a walker have been recommended, do not put weight on the injured leg until you can do so without pain. Check with your healthcare provider before returning to sports or full work duties.    If you have a hook-and-loop knee brace, you can remove it to bathe and sleep, unless told otherwise.  Follow-up care  Follow up with your healthcare provider as advised.  This is usually within 1-2 weeks.  If X-rays were taken, you will be told of any new findings that may affect your care.  Call 911  Call 911 if you have:    Shortness of breath    Chest pain  When to seek medical advice  Call your healthcare provider right away if any of these occur:    Toes or foot becomes swollen, cold, blue, numb, or tingly    Pain or swelling spreads over the knee or calf    Warmth or redness appears over the knee or calf    Other joints become painful    Rash appears    Fever of 100.4 F (38 C) or above lasting for 24 to 48 hours  Date Last Reviewed: 11/23/2015 2000-2017 [employer]. [address] [place] [address], [place] [address]. All rights reserved. This information is not intended as a substitute for professional medical care. Always follow your healthcare professional's instructions.      Crutches and knee immobilizer as needed.

## 2018-04-10 ENCOUNTER — OFFICE VISIT (OUTPATIENT)
Dept: ORTHOPEDICS | Facility: CLINIC | Age: 53
End: 2018-04-10
Attending: NURSE PRACTITIONER
Payer: COMMERCIAL

## 2018-04-10 VITALS — HEIGHT: 64 IN | DIASTOLIC BLOOD PRESSURE: 74 MMHG | SYSTOLIC BLOOD PRESSURE: 110 MMHG

## 2018-04-10 DIAGNOSIS — M25.562 ACUTE PAIN OF LEFT KNEE: Primary | ICD-10-CM

## 2018-04-10 PROCEDURE — 99204 OFFICE O/P NEW MOD 45 MIN: CPT | Performed by: PHYSICAL MEDICINE & REHABILITATION

## 2018-04-10 NOTE — PROGRESS NOTES
Sports Medicine Clinic Visit    PCP: No Ref-Primary, Physician    CC: Patient presents with:  Knee left  Hip left      HPI:  Nilsa Chance is a 52 year old female who is seen in consultation at the request of Leanne Fitzpatrick.   She notes left knee pain that began 1 month ago with gradual onset. She does report that around that time she was squatting down and felt tingling in the back of her knee but is unsure if this is related. She saw a chiropractor approximately a week and a half ago. At that time most of the pain was on the lateral side of the knee and into the outside of the calf. She then was walking down stairs yesterday and felt a pop and intense pain.   She went to the ER yesterday where radiographs were done and were negative.   She was given IM Dilaudid and prescribed oral Dilaudid upon discharge.  She notes that the pain is diffuse over the knee.  She rates the pain at a 15/10 at its worst and a 7/10 currently.  Symptoms are relieved with pain medication and immobilizer. Symptoms are worsened by any movement of the knee and leg. She endorses swelling, popping and instability.   She denies bruising, grinding, catching, locking, numbness, tingling, weakness, pain in other joints and fever, chills.  Other treatment has included cold compresses and elevation.     Review of Systems:  Musculoskeletal: as above  Remainder of review of systems is negative including constitutional, eyes, ENT, CV, pulmonary, GI, , endocrine, skin, hematologic, and neurologic except as noted in HPI or medical history.    History reviewed. No pertinent past surgical/medical/family/social history other than as mentioned in HPI.    Patient Active Problem List   Diagnosis     Generalized anxiety disorder     Psychophysiological insomnia     Menopausal syndrome (hot flashes)     CARDIOVASCULAR SCREENING; LDL GOAL LESS THAN 160     Arthritis of big toe-Left     Major depressive disorder, recurrent episode, mild (H)     Other acne     Bee  "sting allergy     Hyperlipidemia LDL goal <100     OAB (overactive bladder)     Tobacco use disorder     H/O abdominal supracervical subtotal hysterectomy     Past Surgical History:   Procedure Laterality Date     BACK SURGERY  2001,2003     C/SECTION, LOW TRANSVERSE      C-Sections x2     ORTHOPEDIC SURGERY      great toe R fused.     PARTIAL HYSTERECTOMY  2007    Menorrhagia, sparing cervix     TONSILLECTOMY & ADENOIDECTOMY       Family History   Problem Relation Age of Onset     Thyroid Disease Mother      goiter and then drained and put on synthroid.     DIABETES Paternal Grandmother      Social History     Social History     Marital status:      Spouse name: N/A     Number of children: N/A     Years of education: N/A     Occupational History     Not on file.     Social History Main Topics     Smoking status: Current Every Day Smoker     Packs/day: 0.80     Years: 20.00     Types: Cigarettes     Smokeless tobacco: Never Used     Alcohol use No     Drug use: Yes     Special: Marijuana     Sexual activity: Yes     Partners: Male     Birth control/ protection: Female Surgical     Other Topics Concern     Not on file     Social History Narrative       She works in the home.    Current Outpatient Prescriptions   Medication     HYDROmorphone (DILAUDID) 2 MG tablet     HYDROcodone-acetaminophen (NORCO) 5-325 MG per tablet     sertraline (ZOLOFT) 100 MG tablet     QUEtiapine (SEROQUEL) 50 MG tablet     methylPREDNISolone (MEDROL DOSEPAK) 4 MG tablet     EPINEPHrine (EPIPEN 2-TRES) 0.3 MG/0.3ML injection     clindamycin (CLINDAMAX) 1 % gel     LORazepam (ATIVAN) 0.5 MG tablet     Lactobacillus TABS     No current facility-administered medications for this visit.      Allergies   Allergen Reactions     Codeine Sulfate Hives     Paxil [Serotonin Reuptake Inhibitors]      Sleep problems, jittery         Objective:  /74  Ht 5' 4\" (1.626 m)    General: Alert and in no distress    Head: Normocephalic, " atraumatic  Eyes: no scleral icterus or conjunctival erythema   Oropharynx:  Mucous membranes moist  Skin: no erythema, petechiae, or jaundice  CV: regular rhythm by palpation, 2+ distal pulses  Resp: normal respiratory effort without conversational dyspnea   Psych: normal mood and affect    Gait: Antalgic, using crutches and a knee immobilizer  Neuro: Motor strength and sensation as noted below    Musculoskeletal:  *Exam limited due to pain    Bilateral Knee exam    Inspection:  No erythema, ecchymosis, warmth, or edema    Palpation: Tender over the left lateral joint line and popliteal fossa    Knee ROM: Decreased and painful active left knee flexion and extension.  Has a roughly 30 degree extension lag on the left actively, full passively but very painful.   I was unable to get full passive left knee flexion due to her pain.    Hip ROM: Right hip internal rotation is decreased but not painful.    Patellar Motion:      Normal patellar tracking noted through range of motion    Strength:    Left:  5/5 Hip flexion/abduction/adduction, 4+/5 knee extension/flexion, ankle plantarflexion/dorsiflexion  Right:  5/5 Hip flexion/abduction/adduction, knee extension/flexion, ankle plantarflexion/dorsiflexion    Special Tests: Negative anterior/posterior drawer, negative Lachman's, unable to tolerate valgus/varus stress testing, David's test positive for left knee pain    Sensation:  Intact to light touch in the bilateral lower extremities      Radiology:  Independent visualization of images performed.    Recent Results (from the past 744 hour(s))   XR Knee Left 3 Views    Narrative    LEFT KNEE THREE VIEWS  4/9/2018 4:51 PM     HISTORY: Pain.    COMPARISON: 8/2/2011.    FINDINGS: There is no significant degenerative change. No  suprapatellar effusion. There is no acute fracture. No dislocation.   There are no worrisome bony lesions.      Impression    IMPRESSION:  No acute osseous abnormality demonstrated.    SRI BEYER,  MD         Assessment:  1. Acute pain of left knee        Plan:  Discussed the assessment with the patient and developed a plan together:  -Nilsa is having significant pain of the left knee, x-rays are non-diagnostic and there was no acute injury.  I am not really sure what is causing her severe pain.  She may have a degenerative type meniscal tear.  I have ordered an MRI of the left knee to further evaluate the soft tissues.  -Ice or heat 15-20 minutes as needed (Avoid sleeping on a heating pad or ice)  -Patient's preferred over the counter medications as directed on packaging as needed for pain or soreness.  Please take ibuprofen with food. Do not premedicate prior to activity.  -Reserve previously prescribed Dilaudid for severe pain.    -Continue using immobilizer and crutches as needed    Follow Up: Will call with MRI results. Please call with any questions or concerns.       Zita Sheridan MD, CAQ  [facility]

## 2018-04-10 NOTE — PATIENT INSTRUCTIONS
Today's Plan of Care:  -MRI of the left knee - [facility] [phone]   -Ice or heat 15-20 minutes as needed (Avoid sleeping on a heating pad or ice)  -Patient's preferred over the counter medications as directed on packaging as needed for pain or soreness.  Please take ibuprofen with food. Do not premedicate prior to activity.  -Continue using immobilizer and crutches as needed    Follow Up: Will call with MRI results. Please call with any questions or concerns.

## 2018-04-10 NOTE — LETTER
4/10/2018         RE: Nilsa Chance  [address] [place] [id number]        Dear Colleague,    Thank you for referring your patient, Nilsa Chance, to the [facility]. Please see a copy of my visit note below.    Sports Medicine Clinic Visit    PCP: No Ref-Primary, Physician    CC: Patient presents with:  Knee left  Hip left      HPI:  Nilsa Chance is a 52 year old female who is seen in consultation at the request of Leanne Fitzpatrick.   She notes left knee pain that began 1 month ago with gradual onset. She does report that around that time she was squatting down and felt tingling in the back of her knee but is unsure if this is related. She saw a chiropractor approximately a week and a half ago. At that time most of the pain was on the lateral side of the knee and into the outside of the calf. She then was walking down stairs yesterday and felt a pop and intense pain.   She went to the ER yesterday where radiographs were done and were negative.   She was given IM Dilaudid and prescribed oral Dilaudid for pain.  She notes that the pain is diffuse over the knee.  She rates the pain at a 15/10 at its worst and a 7/10 currently.  Symptoms are relieved with pain medication and immobilizer. Symptoms are worsened by any movement of the knee and leg. She endorses swelling, popping and instability.   She denies bruising, grinding, catching, locking, numbness, tingling, weakness, pain in other joints and fever, chills.  Other treatment has included cold compresses and elevation.     Review of Systems:  Musculoskeletal: as above  Remainder of review of systems is negative including constitutional, eyes, ENT, CV, pulmonary, GI, , endocrine, skin, hematologic, and neurologic except as noted in HPI or medical history.    History reviewed. No pertinent past surgical/medical/family/social history other than as mentioned in HPI.    Patient Active Problem List   Diagnosis     Generalized anxiety  disorder     Psychophysiological insomnia     Menopausal syndrome (hot flashes)     CARDIOVASCULAR SCREENING; LDL GOAL LESS THAN 160     Arthritis of big toe-Left     Major depressive disorder, recurrent episode, mild (H)     Other acne     Bee sting allergy     Hyperlipidemia LDL goal <100     OAB (overactive bladder)     Tobacco use disorder     H/O abdominal supracervical subtotal hysterectomy     Past Surgical History:   Procedure Laterality Date     BACK SURGERY  2001,2003     C/SECTION, LOW TRANSVERSE      C-Sections x2     ORTHOPEDIC SURGERY      great toe R fused.     PARTIAL HYSTERECTOMY  2007    Menorrhagia, sparing cervix     TONSILLECTOMY & ADENOIDECTOMY       Family History   Problem Relation Age of Onset     Thyroid Disease Mother      goiter and then drained and put on synthroid.     DIABETES Paternal Grandmother      Social History     Social History     Marital status:      Spouse name: N/A     Number of children: N/A     Years of education: N/A     Occupational History     Not on file.     Social History Main Topics     Smoking status: Current Every Day Smoker     Packs/day: 0.80     Years: 20.00     Types: Cigarettes     Smokeless tobacco: Never Used     Alcohol use No     Drug use: Yes     Special: Marijuana     Sexual activity: Yes     Partners: Male     Birth control/ protection: Female Surgical     Other Topics Concern     Not on file     Social History Narrative       She works in the home.    Current Outpatient Prescriptions   Medication     HYDROmorphone (DILAUDID) 2 MG tablet     HYDROcodone-acetaminophen (NORCO) 5-325 MG per tablet     sertraline (ZOLOFT) 100 MG tablet     QUEtiapine (SEROQUEL) 50 MG tablet     methylPREDNISolone (MEDROL DOSEPAK) 4 MG tablet     EPINEPHrine (EPIPEN 2-TRES) 0.3 MG/0.3ML injection     clindamycin (CLINDAMAX) 1 % gel     LORazepam (ATIVAN) 0.5 MG tablet     Lactobacillus TABS     No current facility-administered medications for this visit.   "    Allergies   Allergen Reactions     Codeine Sulfate Hives     Paxil [Serotonin Reuptake Inhibitors]      Sleep problems, jittery         Objective:  /74  Ht 5' 4\" (1.626 m)    General: Alert and in no distress    Head: Normocephalic, atraumatic  Eyes: no scleral icterus or conjunctival erythema   Oropharynx:  Mucous membranes moist  Skin: no erythema, petechiae, or jaundice  CV: regular rhythm by palpation, 2+ distal pulses  Resp: normal respiratory effort without conversational dyspnea   Psych: normal mood and affect    Gait: Antalgic, using crutches and a knee immobilizer  Neuro: Motor strength and sensation as noted below    Musculoskeletal:  *Exam limited due to pain    Bilateral Knee exam    Inspection:  No erythema, ecchymosis, warmth, or edema    Palpation: Tender over the left lateral joint line and popliteal fossa    Knee ROM: Decreased and painful active left knee flexion and extension.  Has a roughly 30 degree extension lag on the left actively, full passively but very painful.   I was unable to get full passive left knee flexion due to her pain.    Hip ROM: Right hip internal rotation is decreased but not painful.    Patellar Motion:      Normal patellar tracking noted through range of motion    Strength:    Left:  5/5 Hip flexion/abduction/adduction, 4+/5 knee extension/flexion, ankle plantarflexion/dorsiflexion  Right:  5/5 Hip flexion/abduction/adduction, knee extension/flexion, ankle plantarflexion/dorsiflexion    Special Tests: Negative anterior/posterior drawer, negative Lachman's, unable to tolerate valgus/varus stress testing, David's test positive for left knee pain    Sensation:  Intact to light touch in the bilateral lower extremities      Radiology:  Independent visualization of images performed.    Recent Results (from the past 744 hour(s))   XR Knee Left 3 Views    Narrative    LEFT KNEE THREE VIEWS  4/9/2018 4:51 PM     HISTORY: Pain.    COMPARISON: 8/2/2011.    FINDINGS: " There is no significant degenerative change. No  suprapatellar effusion. There is no acute fracture. No dislocation.   There are no worrisome bony lesions.      Impression    IMPRESSION:  No acute osseous abnormality demonstrated.    SRI BEYER MD         Assessment:  1. Acute pain of left knee        Plan:  Discussed the assessment with the patient and developed a plan together:  -Nilsa is having significant pain of the left knee, x-rays are non-diagnostic and there was no acute injury.  I am not really sure what is causing her severe pain.  She may have a degenerative type meniscal tear.  I have ordered an MRI of the left knee to further evaluate the soft tissues.  -Ice or heat 15-20 minutes as needed (Avoid sleeping on a heating pad or ice)  -Patient's preferred over the counter medications as directed on packaging as needed for pain or soreness.  Please take ibuprofen with food. Do not premedicate prior to activity.  -Reserve previously prescribed Dilaudid for severe pain.    -Continue using immobilizer and crutches as needed    Follow Up: Will call with MRI results. Please call with any questions or concerns.       Zita Sheridan MD, CAQ  [facility]        Again, thank you for allowing me to participate in the care of your patient.        Sincerely,        Sammi Sheridan MD

## 2018-04-10 NOTE — MR AVS SNAPSHOT
After Visit Summary   4/10/2018    Nilsa Chance    MRN: [id number]           Patient Information     Date Of Birth          1965        Visit Information        Provider Department      4/10/2018 1:00 PM Sammi Sheridan MD [facility]        Today's Diagnoses     Acute pain of left knee    -  1      Care Instructions    Today's Plan of Care:  -MRI of the left knee - [facility] [phone]   -Ice or heat 15-20 minutes as needed (Avoid sleeping on a heating pad or ice)  -Patient's preferred over the counter medications as directed on packaging as needed for pain or soreness.  Please take ibuprofen with food. Do not premedicate prior to activity.  -Continue using immobilizer and crutches as needed    Follow Up: Will call with MRI results. Please call with any questions or concerns.               Follow-ups after your visit        Future tests that were ordered for you today     Open Future Orders        Priority Expected Expires Ordered    MR Knee Left w/o Contrast Routine  4/10/2019 4/10/2018            Who to contact     If you have questions or need follow up information about today's clinic visit or your schedule please contact [facility] directly at [phone].  Normal or non-critical lab and imaging results will be communicated to you by MyChart, letter or phone within 4 business days after the clinic has received the results. If you do not hear from us within 7 days, please contact the clinic through [employer] or phone. If you have a critical or abnormal lab result, we will notify you by phone as soon as possible.  Submit refill requests through [employer] or call your pharmacy and they will forward the refill request to us. Please allow 3 business days for your refill to be completed.          Additional Information About Your Visit        MyChart Information     [employer] gives you secure access to your electronic health record. If  "you see a primary care provider, you can also send messages to your care team and make appointments. If you have questions, please call your primary care clinic.  If you do not have a primary care provider, please call [phone] and they will assist you.        Care EveryWhere ID     This is your Care EveryWhere ID. This could be used by other organizations to access your [facility] medical records  [id number]        Your Vitals Were     Height                   5' 4\" (1.626 m)            Blood Pressure from Last 3 Encounters:   04/10/18 110/74   04/09/18 112/71   04/06/18 124/70    Weight from Last 3 Encounters:   01/24/18 137 lb (62.1 kg)   10/09/17 141 lb 6.4 oz (64.1 kg)   06/21/16 139 lb 9.6 oz (63.3 kg)               Primary Care Provider Fax #    Physician No Ref-Primary [phone]       No address on file        Equal Access to Services     SETH ABBASI AH: Hadii isaura fajardo hadasho Soomaali, waaxda luqadaha, qaybta kaalmada adeegyada, shelby hollingsworth . So [facility] [phone].    ATENCIÓN: Si habla español, tiene a paredes disposición servicios gratuitos de asistencia lingüística. Llame al [phone].    We comply with applicable federal civil rights laws and [place] laws. We do not discriminate on the basis of race, color, national origin, age, disability, sex, sexual orientation, or gender identity.            Thank you!     Thank you for choosing [facility]  for your care. Our goal is always to provide you with excellent care. Hearing back from our patients is one way we can continue to improve our services. Please take a few minutes to complete the written survey that you may receive in the mail after your visit with us. Thank you!             Your Updated Medication List - Protect others around you: Learn how to safely use, store and throw away your medicines at www.disposemymeds.org.          This list is accurate as of 4/10/18  1:44 PM.  Always use your most recent " med list.                   Brand Name Dispense Instructions for use Diagnosis    clindamycin 1 % topical gel    CLINDAMAX    60 g    Apply topically 2 times daily    Other acne       EPINEPHrine 0.3 MG/0.3ML injection 2-pack    EPIPEN 2-TRES    2 each    Inject 0.3 mLs (0.3 mg) into the muscle once as needed for anaphylaxis    Allergy to insect bites and stings       HYDROcodone-acetaminophen 5-325 MG per tablet    NORCO    20 tablet    Take 1-2 tablets by mouth every 4 hours as needed for pain maximum 10 tablet(s) per day    Acute pain of left knee       HYDROmorphone 2 MG tablet    DILAUDID    20 tablet    Take 1-2 tablets (2-4 mg) by mouth every 6 hours as needed for moderate to severe pain        lactobacillus Tabs      Take 1 tablet by mouth daily.        LORazepam 0.5 MG tablet    ATIVAN    16 tablet    Take 1-2 tablets (0.5-1 mg) by mouth every 8 hours as needed for anxiety    Generalised anxiety disorder       methylPREDNISolone 4 MG tablet    MEDROL DOSEPAK    21 tablet    Follow package instructions    Acute pain of left knee       QUEtiapine 50 MG tablet    SEROquel    90 tablet    Take 1 tablet (50 mg) by mouth At Bedtime    Major depressive disorder, recurrent episode, mild (H)       sertraline 100 MG tablet    ZOLOFT    30 tablet    TAKE ONE TABLET BY MOUTH EVERY DAY    Major depressive disorder, recurrent episode, mild (H)

## 2018-04-12 ENCOUNTER — HOSPITAL ENCOUNTER (OUTPATIENT)
Dept: MRI IMAGING | Facility: CLINIC | Age: 53
Discharge: HOME | End: 2018-04-12
Attending: PHYSICAL MEDICINE & REHABILITATION | Admitting: PHYSICAL MEDICINE & REHABILITATION
Payer: COMMERCIAL

## 2018-04-12 DIAGNOSIS — M25.562 ACUTE PAIN OF LEFT KNEE: ICD-10-CM

## 2018-04-12 PROCEDURE — 73721 MRI JNT OF LWR EXTRE W/O DYE: CPT | Mod: LT

## 2018-04-13 ENCOUNTER — MYC MEDICAL ADVICE (OUTPATIENT)
Dept: ORTHOPEDICS | Facility: CLINIC | Age: 53
End: 2018-04-13

## 2018-04-13 DIAGNOSIS — M25.562 ACUTE PAIN OF LEFT KNEE: Primary | ICD-10-CM

## 2018-04-13 NOTE — TELEPHONE ENCOUNTER
Spoke with the patient. Spoke with patient regarding their MRI results and plan. She is in agreement with the plan. I will place an order for physical therapy in [place]. She is going to call back to schedule a follow up for the steroid injection once she arranges a ride.       Teena Tellez M.Ed., ATR, ATC

## 2018-04-16 ENCOUNTER — OFFICE VISIT (OUTPATIENT)
Dept: ORTHOPEDICS | Facility: CLINIC | Age: 53
End: 2018-04-16
Payer: COMMERCIAL

## 2018-04-16 VITALS
DIASTOLIC BLOOD PRESSURE: 71 MMHG | BODY MASS INDEX: 23.17 KG/M2 | WEIGHT: 135 LBS | TEMPERATURE: 98 F | SYSTOLIC BLOOD PRESSURE: 124 MMHG

## 2018-04-16 DIAGNOSIS — S83.419D SPRAIN OF MEDIAL COLLATERAL LIGAMENT OF KNEE, UNSPECIFIED LATERALITY, SUBSEQUENT ENCOUNTER: Primary | ICD-10-CM

## 2018-04-16 PROCEDURE — 20610 DRAIN/INJ JOINT/BURSA W/O US: CPT | Mod: LT | Performed by: ORTHOPAEDIC SURGERY

## 2018-04-16 PROCEDURE — 99204 OFFICE O/P NEW MOD 45 MIN: CPT | Mod: 25 | Performed by: ORTHOPAEDIC SURGERY

## 2018-04-16 RX ORDER — MELOXICAM 15 MG/1
15 TABLET ORAL DAILY
Qty: 30 TABLET | Refills: 2 | Status: SHIPPED | OUTPATIENT
Start: 2018-04-16 | End: 2018-05-15

## 2018-04-16 ASSESSMENT — PAIN SCALES - GENERAL: PAINLEVEL: MILD PAIN (3)

## 2018-04-16 NOTE — PROGRESS NOTES
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        She is being seen for   Chief Complaints and History of Present Illnesses   Patient presents with     Consult     left knee pain wants injection          History of Present Illness:   Nilsa Chance is a 52 year old female who is seen in consultation at the request of HANS Horner CNP.  History of Present illness:  Nilsa presents for evaluation of:  1.) left knee  Onset: 5 weeks  Symptoms brought on by not sure.   Character:  sharp and dull ache.    Progression of symptoms:  better.    Previous similar pain: no .   Pain Level:  3/10.   Previous treatments:  Has been seen in ER and by Dr Fatimah JUNG, crutches, MRI referral  Currently on Blood thinners? no  Diagnosis of Diabetes? no  Concerned about knee cysts, has global pain, shooting sensation lateral knee/thigh/calf      Patient's past medical, surgical, social and family histories reviewed.       Past Medical History:   Diagnosis Date     Anxiety      Bee sting allergy 4/26/2016     CARDIOVASCULAR SCREENING; LDL GOAL LESS THAN 160 6/29/2011     Depressive disorder      Generalised anxiety disorder 1/17/2011     Insomnia 1/17/2011     Menopausal syndrome (hot flashes) 3/8/2011     OAB (overactive bladder) 1/24/2017     Other acne 4/26/2016     Panic attacks          Past Surgical History:   Procedure Laterality Date     BACK SURGERY  2001,2003     C/SECTION, LOW TRANSVERSE      C-Sections x2     ORTHOPEDIC SURGERY      great toe R fused.     PARTIAL HYSTERECTOMY  2007    Menorrhagia, sparing cervix     TONSILLECTOMY & ADENOIDECTOMY           Medications:    Current Outpatient Prescriptions on File Prior to Visit:  HYDROmorphone (DILAUDID) 2 MG tablet Take 1-2 tablets (2-4 mg) by mouth every 6 hours as needed for moderate to severe pain   methylPREDNISolone (MEDROL DOSEPAK) 4 MG tablet Follow package instructions    HYDROcodone-acetaminophen (NORCO) 5-325 MG per tablet Take 1-2 tablets by mouth every 4 hours as needed for pain maximum 10 tablet(s) per day   sertraline (ZOLOFT) 100 MG tablet TAKE ONE TABLET BY MOUTH EVERY DAY   QUEtiapine (SEROQUEL) 50 MG tablet Take 1 tablet (50 mg) by mouth At Bedtime   EPINEPHrine (EPIPEN 2-TRES) 0.3 MG/0.3ML injection Inject 0.3 mLs (0.3 mg) into the muscle once as needed for anaphylaxis   clindamycin (CLINDAMAX) 1 % gel Apply topically 2 times daily   LORazepam (ATIVAN) 0.5 MG tablet Take 1-2 tablets (0.5-1 mg) by mouth every 8 hours as needed for anxiety   Lactobacillus TABS Take 1 tablet by mouth daily.     No current facility-administered medications on file prior to visit.       Allergies   Allergen Reactions     Codeine Sulfate Hives     Paxil [Serotonin Reuptake Inhibitors]      Sleep problems, jittery         Social History     Occupational History     Not on file.     Social History Main Topics     Smoking status: Current Every Day Smoker     Packs/day: 0.80     Years: 20.00     Types: Cigarettes     Smokeless tobacco: Never Used     Alcohol use No     Drug use: Yes     Special: Marijuana     Sexual activity: Yes     Partners: Male     Birth control/ protection: Female Surgical       Patient does use Tobacco products. Patient not ready to quit at this time.  Strongly encouraged smoking cessation.  Risks of smoking and benefits of quitting were discussed.  Information offered: AVS with information about stopping smoking and advised to discuss smoking cessation medications/strategies with Primary care provider.  3-5 Minutes were spent in counseling.      Family History   Problem Relation Age of Onset     Thyroid Disease Mother      goiter and then drained and put on synthroid.     DIABETES Paternal Grandmother          REVIEW OF SYSTEMS  10 point review systems performed otherwise negative as noted as per history of present illness.      Physical Exam:  Vitals: /71  Temp 98  F  (36.7  C)  Wt 61.2 kg (135 lb)  BMI 23.17 kg/m2  BMI= Body mass index is 23.17 kg/(m^2).    Constitutional: healthy, alert and no acute distress   Psychiatric: mentation appears normal and affect normal/bright  NEURO: no focal deficits  RESP: Normal with easy respirations and no use of accessory muscles to breathe, no audible wheezing or retractions  CV: regular pulse  SKIN: No erythema, rashes, excoriation, or breakdown. No evidence of infection.   JOINT/EXTREMITIES:left Knee Exam: Inspection: AP/lateral alignment normal, No effusion, mild STS  Tender: LCL and LJL  Non-tender: lateral patellar facet, medial patellar facet, inferior pole patella, patella tendon, quadriceps insertion  Active Range of Motion: all normal  Strength: normal  Special tests: normal Valgus stress test, normal Varus, negative Lachman's test    GAIT: antalgic and with assistive device  Lymph: no palpable lymph nodes    Diagnostic Modalities:  left knee X-ray: No fracture, dislocation and or lesion. Normal alignment.  Joint space maintained no significant arthritis. No appreciable soft tissue abnormality  left knee MRI:  Patellofemoral chondromalcia - asx  Independent visualization of the images was performed.      Impression: left Lateral collateral ligament strain    Plan:  All of the above pertinent physical exam and imaging modalities findings was reviewed with Nilsa.                                          CONSERVATIVE CARE:  I recommend conservative care for the patient to include NSAIDs, focused self directed physical therapy, formal physical therapy, steroid injections, activity modifications, d/c crutches. Today I provided or dispensed Mobic prescription, physical therapy, info and hep.                                        INJECTION PROCEDURE:  The patient was counseled about an  injection, including discussion of risks (including infection), contents of the injection, rationale for performing the injection, and expected benefits  of the injection. The skin was prepped with alcohol and betadine and then utilizing sterile technique an injection of the left knee joint from the superior lateral approach in the supine position was performed. The injection consisted 1ml of Kenalog (40mg per 1ml) with 8ml 1% lidocaine plain. The patient tolerated the injection well, and there were no complications. The injection site was covered with a Band-Aid. The injection was performed by Marilee Phoenix M.D.                                        NSAIDS RISKS:  I have prescribed an antiinflammatory medication.  We discussed that it is the same class of some the common over the counter medications (Ibuprofen, Advil, Motrin, Aleve, Naproxen, and  Naprosyn). I recommend to avoid taking these OTC's medication when taking the medication that I prescribed. This medication should be stopped if having stomach issues, bleeding, high blood pressure and/or chest pain    BP Readings from Last 1 Encounters:   04/16/18 124/71       BP noted to be elevated today in office.  Patient to follow up with Primary Care provider regarding elevated blood pressure.    Return to clinic 6, week(s), or sooner as needed for changes.  Re-x-ray on return: No    Marilee Phoenix M.D.

## 2018-04-16 NOTE — NURSING NOTE
"No chief complaint on file.      Initial /71  Temp 98  F (36.7  C)  Wt 61.2 kg (135 lb)  BMI 23.17 kg/m2 Estimated body mass index is 23.17 kg/(m^2) as calculated from the following:    Height as of 4/10/18: 1.626 m (5' 4\").    Weight as of this encounter: 61.2 kg (135 lb).  Medication Reconciliation: complete    BP completed using cuff size: glenroy Barron MA        "

## 2018-04-16 NOTE — LETTER
4/16/2018         RE: Nilsa Chance  [address] [place] [id number]        Dear Colleague,    Thank you for referring your patient, Nilsa Chance, to the [facility]. Please see a copy of my visit note below.    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        She is being seen for   Chief Complaints and History of Present Illnesses   Patient presents with     Consult     left knee pain wants injection          History of Present Illness:   Nilsa Chance is a 52 year old female who is seen in consultation at the request of Leanne Fitzpatrick, APRN CNP.  History of Present illness:  Nilsa presents for evaluation of:  1.) left knee  Onset: 5 weeks  Symptoms brought on by not sure.   Character:  sharp and dull ache.    Progression of symptoms:  better.    Previous similar pain: no .   Pain Level:  3/10.   Previous treatments:  Has been seen in ER and by Dr Fatimah JUNG, crutches, MRI referral  Currently on Blood thinners? no  Diagnosis of Diabetes? no  Concerned about knee cysts, has global pain, shooting sensation lateral knee/thigh/calf      Patient's past medical, surgical, social and family histories reviewed.       Past Medical History:   Diagnosis Date     Anxiety      Bee sting allergy 4/26/2016     CARDIOVASCULAR SCREENING; LDL GOAL LESS THAN 160 6/29/2011     Depressive disorder      Generalised anxiety disorder 1/17/2011     Insomnia 1/17/2011     Menopausal syndrome (hot flashes) 3/8/2011     OAB (overactive bladder) 1/24/2017     Other acne 4/26/2016     Panic attacks          Past Surgical History:   Procedure Laterality Date     BACK SURGERY  2001,2003     C/SECTION, LOW TRANSVERSE      C-Sections x2     ORTHOPEDIC SURGERY      great toe R fused.     PARTIAL HYSTERECTOMY  2007    Menorrhagia, sparing cervix     TONSILLECTOMY & ADENOIDECTOMY           Medications:    Current Outpatient  Prescriptions on File Prior to Visit:  HYDROmorphone (DILAUDID) 2 MG tablet Take 1-2 tablets (2-4 mg) by mouth every 6 hours as needed for moderate to severe pain   methylPREDNISolone (MEDROL DOSEPAK) 4 MG tablet Follow package instructions   HYDROcodone-acetaminophen (NORCO) 5-325 MG per tablet Take 1-2 tablets by mouth every 4 hours as needed for pain maximum 10 tablet(s) per day   sertraline (ZOLOFT) 100 MG tablet TAKE ONE TABLET BY MOUTH EVERY DAY   QUEtiapine (SEROQUEL) 50 MG tablet Take 1 tablet (50 mg) by mouth At Bedtime   EPINEPHrine (EPIPEN 2-TRES) 0.3 MG/0.3ML injection Inject 0.3 mLs (0.3 mg) into the muscle once as needed for anaphylaxis   clindamycin (CLINDAMAX) 1 % gel Apply topically 2 times daily   LORazepam (ATIVAN) 0.5 MG tablet Take 1-2 tablets (0.5-1 mg) by mouth every 8 hours as needed for anxiety   Lactobacillus TABS Take 1 tablet by mouth daily.     No current facility-administered medications on file prior to visit.       Allergies   Allergen Reactions     Codeine Sulfate Hives     Paxil [Serotonin Reuptake Inhibitors]      Sleep problems, jittery         Social History     Occupational History     Not on file.     Social History Main Topics     Smoking status: Current Every Day Smoker     Packs/day: 0.80     Years: 20.00     Types: Cigarettes     Smokeless tobacco: Never Used     Alcohol use No     Drug use: Yes     Special: Marijuana     Sexual activity: Yes     Partners: Male     Birth control/ protection: Female Surgical       Patient does use Tobacco products. Patient not ready to quit at this time.  Strongly encouraged smoking cessation.  Risks of smoking and benefits of quitting were discussed.  Information offered: AVS with information about stopping smoking and advised to discuss smoking cessation medications/strategies with Primary care provider.  3-5 Minutes were spent in counseling.      Family History   Problem Relation Age of Onset     Thyroid Disease Mother      goiter and then  drained and put on synthroid.     DIABETES Paternal Grandmother          REVIEW OF SYSTEMS  10 point review systems performed otherwise negative as noted as per history of present illness.      Physical Exam:  Vitals: /71  Temp 98  F (36.7  C)  Wt 61.2 kg (135 lb)  BMI 23.17 kg/m2  BMI= Body mass index is 23.17 kg/(m^2).    Constitutional: healthy, alert and no acute distress   Psychiatric: mentation appears normal and affect normal/bright  NEURO: no focal deficits  RESP: Normal with easy respirations and no use of accessory muscles to breathe, no audible wheezing or retractions  CV: regular pulse  SKIN: No erythema, rashes, excoriation, or breakdown. No evidence of infection.   JOINT/EXTREMITIES:left Knee Exam: Inspection: AP/lateral alignment normal, No effusion, mild STS  Tender: LCL and LJL  Non-tender: lateral patellar facet, medial patellar facet, inferior pole patella, patella tendon, quadriceps insertion  Active Range of Motion: all normal  Strength: normal  Special tests: normal Valgus stress test, normal Varus, negative Lachman's test    GAIT: antalgic and with assistive device  Lymph: no palpable lymph nodes    Diagnostic Modalities:  left knee X-ray: No fracture, dislocation and or lesion. Normal alignment.  Joint space maintained no significant arthritis. No appreciable soft tissue abnormality  left knee MRI:  Patellofemoral chondromalcia - asx  Independent visualization of the images was performed.      Impression: left Lateral collateral ligament strain    Plan:  All of the above pertinent physical exam and imaging modalities findings was reviewed with Nilsa.                                          CONSERVATIVE CARE:  I recommend conservative care for the patient to include NSAIDs, focused self directed physical therapy, formal physical therapy, steroid injections, activity modifications, d/c crutches. Today I provided or dispensed Mobic prescription, physical therapy, info and hep.                                         INJECTION PROCEDURE:  The patient was counseled about an  injection, including discussion of risks (including infection), contents of the injection, rationale for performing the injection, and expected benefits of the injection. The skin was prepped with alcohol and betadine and then utilizing sterile technique an injection of the left knee joint from the superior lateral approach in the supine position was performed. The injection consisted 1ml of Kenalog (40mg per 1ml) with 8ml 1% lidocaine plain. The patient tolerated the injection well, and there were no complications. The injection site was covered with a Band-Aid. The injection was performed by Marilee Phoenix M.D.                                        NSAIDS RISKS:  I have prescribed an antiinflammatory medication.  We discussed that it is the same class of some the common over the counter medications (Ibuprofen, Advil, Motrin, Aleve, Naproxen, and  Naprosyn). I recommend to avoid taking these OTC's medication when taking the medication that I prescribed. This medication should be stopped if having stomach issues, bleeding, high blood pressure and/or chest pain    BP Readings from Last 1 Encounters:   04/16/18 124/71       BP noted to be elevated today in office.  Patient to follow up with Primary Care provider regarding elevated blood pressure.    Return to clinic 6, week(s), or sooner as needed for changes.  Re-x-ray on return: No    Marilee Phoenix M.D.    Again, thank you for allowing me to participate in the care of your patient.        Sincerely,        Marilee Phoenix MD

## 2018-04-16 NOTE — MR AVS SNAPSHOT
After Visit Summary   4/16/2018    Nilsa Chance    MRN: [id number]           Patient Information     Date Of Birth          1965        Visit Information        Provider Department      4/16/2018 2:40 PM Marilee Phoenix MD [facility]        Care Instructions      Knee Sprain    A sprain is an injury to the ligaments or capsule that holds a joint together. There are no broken bones. Most sprains take 3 to 6 weeks to heal. If it a severe sprain where the ligament is completely torn, it can take months to recover.  Most knee sprains are treated with a splint, knee immobilizer brace, or elastic wrap for support. Severe sprains may require surgery.  Home care    Stay off the injured leg as much as possible until you can walk on it without pain. If you have a lot of pain with walking, crutches or a walker may be prescribed. (These can be rented or purchased at many pharmacies and surgical or orthopedic supply stores). Follow your healthcare provider's advice about when to begin putting weight on that leg.    Keep your leg elevated to reduce pain and swelling. When sleeping, place a pillow under the injured leg. When sitting, support the injured leg so it is level with your waist. This is very important during the first 48 hours.    Apply an ice pack over the injured area for 15 to 20 minutes every 3 to 6 hours. You should do this for the first 24 to 48 hours. You can make an ice pack by filling a plastic bag that seals at the top with ice cubes and then wrapping it with a thin towel. Continue to use ice packs for relief of pain and swelling as needed. As the ice melts, be careful to avoid getting your wrap, splint, or cast wet. After 48 hours, apply heat (warm shower or warm bath) for 15 to 20 minutes several times a day, or alternate ice and heat. You can place the ice pack directly over the splint. If you have to wear a hook-and-loop knee brace, you can open it to apply the  ice pack, or heat, directly to the knee. Never put ice directly on the skin. Always wrap the ice in a towel or other type of cloth.    You may use over-the-counter pain medicine to control pain, unless another pain medicine was prescribed.If you have chronic liver or kidney disease or ever had a stomach ulcer or GI bleeding, talk with your healthcare provider before using these medicines.    If you were given a splint, keep it completely dry at all times. Bathe with your splint out of the water, protected with 2 large plastic bags, rubber-banded at the top end. If a fiberglass splint gets wet, you can dry it with a hair dryer. If you have a hook-and-loop knee brace, you can remove this to bathe, unless told otherwise.  Follow-up care  Follow up with your doctor as advised. Any X-rays you had today don t show any broken bones, breaks, or fractures. Sometimes fractures don t show up on the first X-ray. Bruises and sprains can sometimes hurt as much as a fracture. These injuries can take time to heal completely. If your symptoms don t improve or they get worse, talk with your doctor. You may need a repeat X-ray. If X-rays were taken, you will be told of any new findings that may affect your care.  Call 911  Call 911 if you have:     Shortness of breath     Chest pain  When to seek medical advice  Call your healthcare provider right away if any of these occur:    The splint or knee immobilizer brace becomes wet or soft    The fiberglass cast or splint remains wet for more than 24 hours    Pain or swelling increases    The injured leg or toes become cold, blue, numb, or tingly  Date Last Reviewed: 11/20/2015 2000-2017 [employer]. [address] [place] [address], [place] [address]. All rights reserved. This information is not intended as a substitute for professional medical care. Always follow your healthcare professional's instructions.        Knee Sprain of the Collateral Ligaments  The knee is a hinge joint  supported by four strong ligaments. The two ligaments inside the knee protect this joint from excess forward and backward movement. The ligaments on the outside of the joint prevent side-to-side motion. These are called the collateral ligaments.  The medial collateral ligament is located on the inner side of the joint; and the lateral collateral ligament is on the outer side of the joint.  You have sprained one or both collateral ligaments. A sprain is a tearing of a ligament. The tear may be partial or complete. Diagnosis is made by physical exam. In the case of an acute injury, the knee may be too swollen or painful to examine fully. A more accurate exam can be done after the initial swelling goes down.  Symptoms of a knee sprain include immediate knee swelling, pain, and difficulty walking. Initial treatment includes rest, splinting the knee to reduce movement of the joint, and icing the knee to reduce swelling and pain. You may use over-the-counter pain medicine to control pain, unless another medicine was prescribed. Most sprains will heal in 3 to 6 weeks. A severe injury can take 3 to 4 months to heal. You will also need rehab exercises. Surgery is usually not required for sprains involving only the collateral ligaments.  Home care    Stay off the injured leg as much as possible until you can walk on it without pain. If you have a lot of pain while walking, crutches, or a walker may be prescribed. These can be rented or purchased at many pharmacies and surgical or orthopedic supply stores. Follow your healthcare provider s advice about when to begin bearing weight on that leg.     If you were given a hook-and-loop closure knee brace, you can remove it to bathe. But leave it in place when walking, sitting, or lying down unless told otherwise.    Apply an ice pack over the injured area for 15 to 20 minutes every 3 to 6 hours. You should do this for the first 24 to 48 hours. You can make an ice pack by filling a  plastic bag that seals at the top with ice cubes and then wrapping it with a thin towel. Continue to use ice packs for relief of pain and swelling as needed. As the ice melts, be careful to avoid getting your wrap, splint, or cast wet. After 48 hours, apply heat (warm shower or warm bath) for 15 to 20 minutes several times a day, or alternate ice and heat. You can place the ice pack directly over the splint. If you have to wear a hook-and-loop knee brace, you can open it to apply the ice pack, or heat, directly to the knee. Never put ice directly on the skin. Always wrap the ice in a towel or other type of cloth.    You may use over-the-counter pain medicine to control pain, unless another pain medicine was prescribed. Anti-inflammatory pain medicines, such as ibuprofen or naproxen may be more effective than acetaminophen. If you have chronic liver or kidney disease or ever had a stomach ulcer or GI bleeding, talk with your healthcare provider before using these medicines.  Follow-up care  Follow up with your healthcare provider as advised. Any X-rays you had today don t show any broken bones, breaks, or fractures. Sometimes fractures don t show up on the first X-ray. Bruises and sprains can sometimes hurt as much as a fracture. These injuries can take time to heal completely. If your symptoms don t improve or they get worse, talk with your healthcare provider. You may need a repeat X-ray. If X-rays were taken, you will be told of any new findings that may affect your care.  Call 911  Call 911 if you have:     Shortness of breath     Chest pain  When to seek medical advice  Call your healthcare provider right away if any of these occur:    Pain or swelling increases    Swelling, redness, or pain in the calf or thigh  Date Last Reviewed: 11/20/2015 2000-2017 [employer]. [address] [facility], [place] [address]. All rights reserved. This information is not intended as a substitute for professional  medical care. Always follow your healthcare professional's instructions.        Quadriceps, Isometric (Strength)    This exercise is for an injured right knee. Switch sides if the injury is to your left knee.  1. Sit on the floor with your right leg straight in front of you. Bend your left knee up and put your left foot flat on the floor.  2. Flex your right foot and tighten the thigh muscles of your right leg. Press the back of your right knee toward the floor. Don t arch your back or hunch your shoulders.  3. Hold for 5 to 10 seconds. Then relax.  4. Repeat 10 times, or as instructed.  5. Do this exercise 3 times a day, or as instructed.  Date Last Reviewed: 3/10/2016    8786-0759 [employer]. [address] [place] [address]. All rights reserved. This information is not intended as a substitute for professional medical care. Always follow your healthcare professional's instructions.        Quadriceps, Short Arcs (Strength)    6. Sit on the floor with your right leg straight in front of you. Bend your left knee up and put your left foot flat on the floor.  7. Place a rolled-up towel under your right thigh, just above your knee. Relax your leg.  8. Straighten your right leg, lifting your foot off the floor. Hold for 5 seconds. Then relax.  9. Repeat 10 times, or as instructed.  10. Switch legs and then repeat.     Challenge yourself  Use ankle weights for a tougher workout. Your healthcare provider will tell you what size ankle weights to use.   Date Last Reviewed: 3/10/2016    [id number] [employer]. [address] [place] [address]. All rights reserved. This information is not intended as a substitute for professional medical care. Always follow your healthcare professional's instructions.        Gastrocnemius Stretch (Flexibility)    11. Stand facing a wall from 3 feet away. Take one step toward the wall with your right foot.  12. Place both palms on the wall. Bend  your right knee.  13. Lean forward, keeping the left leg straight and the left heel on the floor.  14. Hold for 30 to 60 seconds. Repeat 2 times, or as instructed.  15. Switch legs and repeat.  Date Last Reviewed: 3/10/2016    [id number] [employer]. [address] [place] [address]. All rights reserved. This information is not intended as a substitute for professional medical care. Always follow your healthcare professional's instructions.        Heel Slides    This exercise is for an injured right knee. Switch sides if the injury is to your left knee.  16. Sit on the floor with your legs straight in front of you.  17. Slide your right heel along the floor toward you, bending your knee and keeping your foot flexed.  18. Move it as close to you as you comfortably can. Hold for 5 to 10 seconds. Then slide your heel back.  19. Repeat 5 times, or as instructed.     Tip: If you re sitting on carpet, put a plastic bag under your heel to make it slide more easily. If you re sitting on a hard floor, put a small towel under your heel.   Date Last Reviewed: 3/10/2016    [id number] [employer]. [address] [place] [address]. All rights reserved. This information is not intended as a substitute for professional medical care. Always follow your healthcare professional's instructions.                Follow-ups after your visit        Who to contact     If you have questions or need follow up information about today's clinic visit or your schedule please contact [facility] directly at [phone].  Normal or non-critical lab and imaging results will be communicated to you by MyChart, letter or phone within 4 business days after the clinic has received the results. If you do not hear from us within 7 days, please contact the clinic through MyChart or phone. If you have a critical or abnormal lab result, we will notify you by phone as soon as possible.  Submit refill  requests through [employer] or call your pharmacy and they will forward the refill request to us. Please allow 3 business days for your refill to be completed.          Additional Information About Your Visit        [facility] Information     [employer] gives you secure access to your electronic health record. If you see a primary care provider, you can also send messages to your care team and make appointments. If you have questions, please call your primary care clinic.  If you do not have a primary care provider, please call [phone] and they will assist you.        Care EveryWhere ID     This is your Care EveryWhere ID. This could be used by other organizations to access your [facility] medical records  [id number]        Your Vitals Were     Temperature BMI (Body Mass Index)                98  F (36.7  C) 23.17 kg/m2           Blood Pressure from Last 3 Encounters:   04/16/18 124/71   04/10/18 110/74   04/09/18 112/71    Weight from Last 3 Encounters:   04/16/18 61.2 kg (135 lb)   01/24/18 62.1 kg (137 lb)   10/09/17 64.1 kg (141 lb 6.4 oz)              Today, you had the following     No orders found for display       Primary Care Provider Fax #    Physician No Ref-Primary [phone]       No address on file        Equal Access to Services     SETH ABBASI : Hadii isaura cabano Sopatali, waaxda luqadaha, qaybta kaalmada adeegyada, shelby hollingsworth . So [facility] [phone].    ATENCIÓN: Si habla español, tiene a paredes disposición servicios gratuitos de asistencia lingüística. Llame al [phone].    We comply with applicable federal civil rights laws and [place] laws. We do not discriminate on the basis of race, color, national origin, age, disability, sex, sexual orientation, or gender identity.            Thank you!     Thank you for choosing [facility]  for your care. Our goal is always to provide you with excellent care. Hearing back from our patients is one way we can  continue to improve our services. Please take a few minutes to complete the written survey that you may receive in the mail after your visit with us. Thank you!             Your Updated Medication List - Protect others around you: Learn how to safely use, store and throw away your medicines at www.disposemymeds.org.          This list is accurate as of 4/16/18  3:18 PM.  Always use your most recent med list.                   Brand Name Dispense Instructions for use Diagnosis    clindamycin 1 % topical gel    CLINDAMAX    60 g    Apply topically 2 times daily    Other acne       EPINEPHrine 0.3 MG/0.3ML injection 2-pack    EPIPEN 2-TRES    2 each    Inject 0.3 mLs (0.3 mg) into the muscle once as needed for anaphylaxis    Allergy to insect bites and stings       HYDROcodone-acetaminophen 5-325 MG per tablet    NORCO    20 tablet    Take 1-2 tablets by mouth every 4 hours as needed for pain maximum 10 tablet(s) per day    Acute pain of left knee       HYDROmorphone 2 MG tablet    DILAUDID    20 tablet    Take 1-2 tablets (2-4 mg) by mouth every 6 hours as needed for moderate to severe pain        lactobacillus Tabs      Take 1 tablet by mouth daily.        LORazepam 0.5 MG tablet    ATIVAN    16 tablet    Take 1-2 tablets (0.5-1 mg) by mouth every 8 hours as needed for anxiety    Generalised anxiety disorder       methylPREDNISolone 4 MG tablet    MEDROL DOSEPAK    21 tablet    Follow package instructions    Acute pain of left knee       QUEtiapine 50 MG tablet    SEROquel    90 tablet    Take 1 tablet (50 mg) by mouth At Bedtime    Major depressive disorder, recurrent episode, mild (H)       sertraline 100 MG tablet    ZOLOFT    30 tablet    TAKE ONE TABLET BY MOUTH EVERY DAY    Major depressive disorder, recurrent episode, mild (H)

## 2018-04-16 NOTE — PATIENT INSTRUCTIONS
Knee Sprain    A sprain is an injury to the ligaments or capsule that holds a joint together. There are no broken bones. Most sprains take 3 to 6 weeks to heal. If it a severe sprain where the ligament is completely torn, it can take months to recover.  Most knee sprains are treated with a splint, knee immobilizer brace, or elastic wrap for support. Severe sprains may require surgery.  Home care    Stay off the injured leg as much as possible until you can walk on it without pain. If you have a lot of pain with walking, crutches or a walker may be prescribed. (These can be rented or purchased at many pharmacies and surgical or orthopedic supply stores). Follow your healthcare provider's advice about when to begin putting weight on that leg.    Keep your leg elevated to reduce pain and swelling. When sleeping, place a pillow under the injured leg. When sitting, support the injured leg so it is level with your waist. This is very important during the first 48 hours.    Apply an ice pack over the injured area for 15 to 20 minutes every 3 to 6 hours. You should do this for the first 24 to 48 hours. You can make an ice pack by filling a plastic bag that seals at the top with ice cubes and then wrapping it with a thin towel. Continue to use ice packs for relief of pain and swelling as needed. As the ice melts, be careful to avoid getting your wrap, splint, or cast wet. After 48 hours, apply heat (warm shower or warm bath) for 15 to 20 minutes several times a day, or alternate ice and heat. You can place the ice pack directly over the splint. If you have to wear a hook-and-loop knee brace, you can open it to apply the ice pack, or heat, directly to the knee. Never put ice directly on the skin. Always wrap the ice in a towel or other type of cloth.    You may use over-the-counter pain medicine to control pain, unless another pain medicine was prescribed.If you have chronic liver or kidney disease or ever had a stomach  ulcer or GI bleeding, talk with your healthcare provider before using these medicines.    If you were given a splint, keep it completely dry at all times. Bathe with your splint out of the water, protected with 2 large plastic bags, rubber-banded at the top end. If a fiberglass splint gets wet, you can dry it with a hair dryer. If you have a hook-and-loop knee brace, you can remove this to bathe, unless told otherwise.  Follow-up care  Follow up with your doctor as advised. Any X-rays you had today don t show any broken bones, breaks, or fractures. Sometimes fractures don t show up on the first X-ray. Bruises and sprains can sometimes hurt as much as a fracture. These injuries can take time to heal completely. If your symptoms don t improve or they get worse, talk with your doctor. You may need a repeat X-ray. If X-rays were taken, you will be told of any new findings that may affect your care.  Call 911  Call 911 if you have:     Shortness of breath     Chest pain  When to seek medical advice  Call your healthcare provider right away if any of these occur:    The splint or knee immobilizer brace becomes wet or soft    The fiberglass cast or splint remains wet for more than 24 hours    Pain or swelling increases    The injured leg or toes become cold, blue, numb, or tingly  Date Last Reviewed: 11/20/2015 2000-2017 [employer]. [address] [place] [address]. All rights reserved. This information is not intended as a substitute for professional medical care. Always follow your healthcare professional's instructions.        Knee Sprain of the Collateral Ligaments  The knee is a hinge joint supported by four strong ligaments. The two ligaments inside the knee protect this joint from excess forward and backward movement. The ligaments on the outside of the joint prevent side-to-side motion. These are called the collateral ligaments.  The medial collateral ligament is located on the inner side  of the joint; and the lateral collateral ligament is on the outer side of the joint.  You have sprained one or both collateral ligaments. A sprain is a tearing of a ligament. The tear may be partial or complete. Diagnosis is made by physical exam. In the case of an acute injury, the knee may be too swollen or painful to examine fully. A more accurate exam can be done after the initial swelling goes down.  Symptoms of a knee sprain include immediate knee swelling, pain, and difficulty walking. Initial treatment includes rest, splinting the knee to reduce movement of the joint, and icing the knee to reduce swelling and pain. You may use over-the-counter pain medicine to control pain, unless another medicine was prescribed. Most sprains will heal in 3 to 6 weeks. A severe injury can take 3 to 4 months to heal. You will also need rehab exercises. Surgery is usually not required for sprains involving only the collateral ligaments.  Home care    Stay off the injured leg as much as possible until you can walk on it without pain. If you have a lot of pain while walking, crutches, or a walker may be prescribed. These can be rented or purchased at many pharmacies and surgical or orthopedic supply stores. Follow your healthcare provider s advice about when to begin bearing weight on that leg.     If you were given a hook-and-loop closure knee brace, you can remove it to bathe. But leave it in place when walking, sitting, or lying down unless told otherwise.    Apply an ice pack over the injured area for 15 to 20 minutes every 3 to 6 hours. You should do this for the first 24 to 48 hours. You can make an ice pack by filling a plastic bag that seals at the top with ice cubes and then wrapping it with a thin towel. Continue to use ice packs for relief of pain and swelling as needed. As the ice melts, be careful to avoid getting your wrap, splint, or cast wet. After 48 hours, apply heat (warm shower or warm bath) for 15 to 20  minutes several times a day, or alternate ice and heat. You can place the ice pack directly over the splint. If you have to wear a hook-and-loop knee brace, you can open it to apply the ice pack, or heat, directly to the knee. Never put ice directly on the skin. Always wrap the ice in a towel or other type of cloth.    You may use over-the-counter pain medicine to control pain, unless another pain medicine was prescribed. Anti-inflammatory pain medicines, such as ibuprofen or naproxen may be more effective than acetaminophen. If you have chronic liver or kidney disease or ever had a stomach ulcer or GI bleeding, talk with your healthcare provider before using these medicines.  Follow-up care  Follow up with your healthcare provider as advised. Any X-rays you had today don t show any broken bones, breaks, or fractures. Sometimes fractures don t show up on the first X-ray. Bruises and sprains can sometimes hurt as much as a fracture. These injuries can take time to heal completely. If your symptoms don t improve or they get worse, talk with your healthcare provider. You may need a repeat X-ray. If X-rays were taken, you will be told of any new findings that may affect your care.  Call 911  Call 911 if you have:     Shortness of breath     Chest pain  When to seek medical advice  Call your healthcare provider right away if any of these occur:    Pain or swelling increases    Swelling, redness, or pain in the calf or thigh  Date Last Reviewed: 11/20/2015 2000-2017 [employer]. [address] [place] [address]. All rights reserved. This information is not intended as a substitute for professional medical care. Always follow your healthcare professional's instructions.        Quadriceps, Isometric (Strength)    This exercise is for an injured right knee. Switch sides if the injury is to your left knee.  1. Sit on the floor with your right leg straight in front of you. Bend your left knee up and  put your left foot flat on the floor.  2. Flex your right foot and tighten the thigh muscles of your right leg. Press the back of your right knee toward the floor. Don t arch your back or hunch your shoulders.  3. Hold for 5 to 10 seconds. Then relax.  4. Repeat 10 times, or as instructed.  5. Do this exercise 3 times a day, or as instructed.  Date Last Reviewed: 3/10/2016    6541-8175 [employer]. [address] [place] [address]. All rights reserved. This information is not intended as a substitute for professional medical care. Always follow your healthcare professional's instructions.        Quadriceps, Short Arcs (Strength)    6. Sit on the floor with your right leg straight in front of you. Bend your left knee up and put your left foot flat on the floor.  7. Place a rolled-up towel under your right thigh, just above your knee. Relax your leg.  8. Straighten your right leg, lifting your foot off the floor. Hold for 5 seconds. Then relax.  9. Repeat 10 times, or as instructed.  10. Switch legs and then repeat.     Challenge yourself  Use ankle weights for a tougher workout. Your healthcare provider will tell you what size ankle weights to use.   Date Last Reviewed: 3/10/2016    5752-2400 [employer]. [address] [place] [address]. All rights reserved. This information is not intended as a substitute for professional medical care. Always follow your healthcare professional's instructions.        Gastrocnemius Stretch (Flexibility)    11. Stand facing a wall from 3 feet away. Take one step toward the wall with your right foot.  12. Place both palms on the wall. Bend your right knee.  13. Lean forward, keeping the left leg straight and the left heel on the floor.  14. Hold for 30 to 60 seconds. Repeat 2 times, or as instructed.  15. Switch legs and repeat.  Date Last Reviewed: 3/10/2016    2371-8582 [employer]. [address] [place] [address]  [address]. All rights reserved. This information is not intended as a substitute for professional medical care. Always follow your healthcare professional's instructions.        Heel Slides    This exercise is for an injured right knee. Switch sides if the injury is to your left knee.  16. Sit on the floor with your legs straight in front of you.  17. Slide your right heel along the floor toward you, bending your knee and keeping your foot flexed.  18. Move it as close to you as you comfortably can. Hold for 5 to 10 seconds. Then slide your heel back.  19. Repeat 5 times, or as instructed.     Tip: If you re sitting on carpet, put a plastic bag under your heel to make it slide more easily. If you re sitting on a hard floor, put a small towel under your heel.   Date Last Reviewed: 3/10/2016    4215-3883 [employer]. [address] [place] [address], [place] [address]. All rights reserved. This information is not intended as a substitute for professional medical care. Always follow your healthcare professional's instructions.

## 2018-05-08 DIAGNOSIS — F33.0 MAJOR DEPRESSIVE DISORDER, RECURRENT EPISODE, MILD (H): ICD-10-CM

## 2018-05-09 ENCOUNTER — TELEPHONE (OUTPATIENT)
Dept: FAMILY MEDICINE | Facility: OTHER | Age: 53
End: 2018-05-09

## 2018-05-09 DIAGNOSIS — F33.0 MAJOR DEPRESSIVE DISORDER, RECURRENT EPISODE, MILD (H): ICD-10-CM

## 2018-05-09 RX ORDER — SERTRALINE HYDROCHLORIDE 100 MG/1
TABLET, FILM COATED ORAL
Qty: 30 TABLET | Refills: 0 | OUTPATIENT
Start: 2018-05-09

## 2018-05-09 NOTE — TELEPHONE ENCOUNTER
Sertraline    PHQ-9 SCORE 11/24/2014 4/26/2016 10/9/2017   Total Score 0 - -   Total Score MyChart - - 1 (Minimal depression)   Total Score - 5 1     Routing refill request to provider for review/approval because:  Isabelle given x1 and patient did not follow up, please advise    Christy Bergman, RN, BSN

## 2018-05-09 NOTE — TELEPHONE ENCOUNTER
Reason for Call:  Medication or medication refill:    Do you use a [facility]?  Name of the pharmacy and phone number for the current request:  [facility] - [phone]    Name of the medication requested: sertraline (ZOLOFT) 100 MG tablet    Other request:     Can we leave a detailed message on this number? YES    Phone number patient can be reached at: Home number on file [phone] (home)    Best Time: any    Call taken on 5/9/2018 at 5:01 PM by Amara Renee

## 2018-05-09 NOTE — TELEPHONE ENCOUNTER
LMTC  Please  Inform patient of the message below and assist with scheduling.  Shasha Collins, CMA

## 2018-05-09 NOTE — TELEPHONE ENCOUNTER
Informed patient of message below, she declined scheduling an appointment, stated she was seen by Leanne in January.

## 2018-05-10 RX ORDER — SERTRALINE HYDROCHLORIDE 100 MG/1
100 TABLET, FILM COATED ORAL DAILY
Qty: 15 TABLET | Refills: 0 | Status: SHIPPED | OUTPATIENT
Start: 2018-05-10 | End: 2018-05-15

## 2018-05-10 NOTE — TELEPHONE ENCOUNTER
Tried calling pt,no answer. Sent [employer] msg informing her of #15 pills and to set up an appt if she wants more.  ................Malick Yung LPN,   May 10, 2018,      12:20 PM,   [facility]

## 2018-05-10 NOTE — TELEPHONE ENCOUNTER
I saw her for a physical, I have never filled her Zoloft or discussed this with her.  I sent over 15 tablets, but she will need a visit to discuss the reasons she is taking this if she wants further refills from me.     Electronically signed by Leanne Fitzpatrick CNP.

## 2018-05-15 ENCOUNTER — OFFICE VISIT (OUTPATIENT)
Dept: FAMILY MEDICINE | Facility: OTHER | Age: 53
End: 2018-05-15
Payer: COMMERCIAL

## 2018-05-15 VITALS
WEIGHT: 137 LBS | BODY MASS INDEX: 23.52 KG/M2 | TEMPERATURE: 98.5 F | SYSTOLIC BLOOD PRESSURE: 114 MMHG | HEART RATE: 94 BPM | RESPIRATION RATE: 16 BRPM | OXYGEN SATURATION: 98 % | DIASTOLIC BLOOD PRESSURE: 64 MMHG

## 2018-05-15 DIAGNOSIS — F41.1 GENERALIZED ANXIETY DISORDER: ICD-10-CM

## 2018-05-15 DIAGNOSIS — F33.0 MAJOR DEPRESSIVE DISORDER, RECURRENT EPISODE, MILD (H): ICD-10-CM

## 2018-05-15 DIAGNOSIS — Z76.89 ESTABLISHING CARE WITH NEW DOCTOR, ENCOUNTER FOR: Primary | ICD-10-CM

## 2018-05-15 PROCEDURE — 99214 OFFICE O/P EST MOD 30 MIN: CPT | Performed by: NURSE PRACTITIONER

## 2018-05-15 RX ORDER — SERTRALINE HYDROCHLORIDE 100 MG/1
100 TABLET, FILM COATED ORAL DAILY
Qty: 90 TABLET | Refills: 3 | Status: SHIPPED | OUTPATIENT
Start: 2018-05-15 | End: 2019-05-16

## 2018-05-15 RX ORDER — QUETIAPINE FUMARATE 50 MG/1
TABLET, FILM COATED ORAL
Qty: 90 TABLET | Refills: 3 | Status: SHIPPED | OUTPATIENT
Start: 2018-05-15 | End: 2019-06-18

## 2018-05-15 ASSESSMENT — ANXIETY QUESTIONNAIRES
3. WORRYING TOO MUCH ABOUT DIFFERENT THINGS: NOT AT ALL
7. FEELING AFRAID AS IF SOMETHING AWFUL MIGHT HAPPEN: NOT AT ALL
5. BEING SO RESTLESS THAT IT IS HARD TO SIT STILL: NOT AT ALL
1. FEELING NERVOUS, ANXIOUS, OR ON EDGE: NOT AT ALL
2. NOT BEING ABLE TO STOP OR CONTROL WORRYING: NOT AT ALL
GAD7 TOTAL SCORE: 0
IF YOU CHECKED OFF ANY PROBLEMS ON THIS QUESTIONNAIRE, HOW DIFFICULT HAVE THESE PROBLEMS MADE IT FOR YOU TO DO YOUR WORK, TAKE CARE OF THINGS AT HOME, OR GET ALONG WITH OTHER PEOPLE: NOT DIFFICULT AT ALL
6. BECOMING EASILY ANNOYED OR IRRITABLE: NOT AT ALL

## 2018-05-15 ASSESSMENT — PATIENT HEALTH QUESTIONNAIRE - PHQ9: 5. POOR APPETITE OR OVEREATING: NOT AT ALL

## 2018-05-15 NOTE — PROGRESS NOTES
SUBJECTIVE:   Nilsa Chance is a 52 year old female who presents to clinic today for the following health issues:    Patient has previously been under the care of JETHRO Parsons who has left the clinic.  Patient requests transfer care to me. Patient medications reconciled, PMH and Problem list reviewed and HM updated.    Depression/Anxiety Followup    Status since last visit: Stable - needs refills today    See PHQ-9 for current symptoms.  Other associated symptoms: None    Complicating factors:   Significant life event:  Yes   Current substance abuse:  None  Anxiety or Panic symptoms:  No    PHQ-9 4/26/2016 10/9/2017 5/15/2018   Total Score 5 1 0   Q9: Suicide Ideation Not at all Not at all Not at all     TERRY-7 SCORE 10/9/2017 5/15/2018   Total Score 3 (minimal anxiety) -   Total Score 3 0       Has been on the same dosages of medications for several years.  They have been stable.     Problem list and histories reviewed & adjusted, as indicated.  Additional history: as documented    Patient Active Problem List   Diagnosis     Generalized anxiety disorder     Psychophysiological insomnia     Menopausal syndrome (hot flashes)     CARDIOVASCULAR SCREENING; LDL GOAL LESS THAN 160     Arthritis of big toe-Left     Major depressive disorder, recurrent episode, mild (H)     Other acne     Bee sting allergy     Hyperlipidemia LDL goal <100     OAB (overactive bladder)     Tobacco use disorder     H/O abdominal supracervical subtotal hysterectomy     Past Surgical History:   Procedure Laterality Date     BACK SURGERY  2001,2003     C/SECTION, LOW TRANSVERSE      C-Sections x2     ORTHOPEDIC SURGERY      great toe R fused.     PARTIAL HYSTERECTOMY  2007    Menorrhagia, sparing cervix     TONSILLECTOMY & ADENOIDECTOMY         Social History   Substance Use Topics     Smoking status: Current Every Day Smoker     Packs/day: 0.80     Years: 20.00     Types: Cigarettes     Smokeless tobacco: Never Used     Alcohol use No      Family History   Problem Relation Age of Onset     Thyroid Disease Mother      goiter and then drained and put on synthroid.     DIABETES Paternal Grandmother          Current Outpatient Prescriptions   Medication Sig Dispense Refill     clindamycin (CLINDAMAX) 1 % gel Apply topically 2 times daily 60 g 11     EPINEPHrine (EPIPEN 2-TRES) 0.3 MG/0.3ML injection Inject 0.3 mLs (0.3 mg) into the muscle once as needed for anaphylaxis 2 each 1     Lactobacillus TABS Take 1 tablet by mouth daily.       LORazepam (ATIVAN) 0.5 MG tablet Take 1-2 tablets (0.5-1 mg) by mouth every 8 hours as needed for anxiety 16 tablet 0     QUEtiapine (SEROQUEL) 50 MG tablet TAKE ONE TABLET BY MOUTH AT BEDTIME 30 tablet 0     sertraline (ZOLOFT) 100 MG tablet Take 1 tablet (100 mg) by mouth daily 15 tablet 0     Allergies   Allergen Reactions     Codeine Sulfate Hives     Paxil [Serotonin Reuptake Inhibitors]      Sleep problems, jittery     BP Readings from Last 3 Encounters:   05/15/18 114/64   04/16/18 124/71   04/10/18 110/74    Wt Readings from Last 3 Encounters:   05/15/18 137 lb (62.1 kg)   04/16/18 135 lb (61.2 kg)   01/24/18 137 lb (62.1 kg)                    Reviewed and updated as needed this visit by clinical staff  Tobacco  Allergies  Med Hx  Surg Hx  Fam Hx  Soc Hx      Reviewed and updated as needed this visit by Provider         ROS:  Constitutional, HEENT, cardiovascular, pulmonary, gi and gu systems are negative, except as otherwise noted.    OBJECTIVE:     /64  Pulse 94  Temp 98.5  F (36.9  C) (Tympanic)  Resp 16  Wt 137 lb (62.1 kg)  SpO2 98%  Breastfeeding? No  BMI 23.52 kg/m2  Body mass index is 23.52 kg/(m^2).  GENERAL: healthy, alert and no distress  RESP: lungs clear to auscultation - no rales, rhonchi or wheezes  CV: regular rate and rhythm, normal S1 S2, no S3 or S4, no murmur, click or rub, no peripheral edema and peripheral pulses strong  PSYCH: mentation appears normal, affect  normal/bright    Diagnostic Test Results:  none     ASSESSMENT/PLAN:         1. Establishing care with new doctor, encounter for    2. Major depressive disorder, recurrent episode, mild (H)  Chronic, controlled.  No change in treatment plan.  She requested refills for 1 year and this was given.  Her dosages have been stable for years, PHQ-9 is 0 today.  She will follow up early with any changes or issues.    - sertraline (ZOLOFT) 100 MG tablet; Take 1 tablet (100 mg) by mouth daily  Dispense: 90 tablet; Refill: 3  - QUEtiapine (SEROQUEL) 50 MG tablet; TAKE ONE TABLET BY MOUTH AT BEDTIME  Dispense: 90 tablet; Refill: 3    3. Generalized anxiety disorder  Chronic, controlled.  No change in treatment plan.   - sertraline (ZOLOFT) 100 MG tablet; Take 1 tablet (100 mg) by mouth daily  Dispense: 90 tablet; Refill: 3  - QUEtiapine (SEROQUEL) 50 MG tablet; TAKE ONE TABLET BY MOUTH AT BEDTIME  Dispense: 90 tablet; Refill: 3        FUTURE APPOINTMENTS:       - Follow-up for annual visit or as needed  See Patient Instructions    [facility]

## 2018-05-15 NOTE — MR AVS SNAPSHOT
After Visit Summary   5/15/2018    Nilsa Chance    MRN: [id number]           Patient Information     Date Of Birth          1965        Visit Information        Provider Department      5/15/2018 1:00 PM Leanne Fitzpatrick APRN CNP [facility]        Today's Diagnoses     Major depressive disorder, recurrent episode, mild (H)    -  1    Generalized anxiety disorder          Care Instructions    Medications refilled for 1 year.                    Follow-ups after your visit        Who to contact     If you have questions or need follow up information about today's clinic visit or your schedule please contact [facility] directly at [phone].  Normal or non-critical lab and imaging results will be communicated to you by [employer], letter or phone within 4 business days after the clinic has received the results. If you do not hear from us within 7 days, please contact the clinic through [employer] or phone. If you have a critical or abnormal lab result, we will notify you by phone as soon as possible.  Submit refill requests through [employer] or call your pharmacy and they will forward the refill request to us. Please allow 3 business days for your refill to be completed.          Additional Information About Your Visit        MyChart Information     [employer] gives you secure access to your electronic health record. If you see a primary care provider, you can also send messages to your care team and make appointments. If you have questions, please call your primary care clinic.  If you do not have a primary care provider, please call [phone] and they will assist you.        Care EveryWhere ID     This is your Care EveryWhere ID. This could be used by other organizations to access your [place] medical records  [id number]        Your Vitals Were     Pulse Temperature Respirations Pulse Oximetry Breastfeeding? BMI (Body Mass Index)    94 98.5  F (36.9  C) (Tympanic) 16 98%  No 23.52 kg/m2       Blood Pressure from Last 3 Encounters:   05/15/18 114/64   04/16/18 124/71   04/10/18 110/74    Weight from Last 3 Encounters:   05/15/18 137 lb (62.1 kg)   04/16/18 135 lb (61.2 kg)   01/24/18 137 lb (62.1 kg)              Today, you had the following     No orders found for display         Today's Medication Changes          These changes are accurate as of 5/15/18  1:18 PM.  If you have any questions, ask your nurse or doctor.               These medicines have changed or have updated prescriptions.        Dose/Directions    QUEtiapine 50 MG tablet   Commonly known as:  SEROquel   This may have changed:  See the new instructions.   Used for:  Major depressive disorder, recurrent episode, mild (H)   Changed by:  Leanne Fitzpatrick APRN CNP        TAKE ONE TABLET BY MOUTH AT BEDTIME   Quantity:  90 tablet   Refills:  3            Where to get your medicines      These medications were sent to [facility] [place] [address]   [address] [facility] [address]     Phone:  [phone]     QUEtiapine 50 MG tablet    sertraline 100 MG tablet                Primary Care Provider Office Phone # Fax #    HANS Horner [phone]       [address] [facility] [address]        Equal Access to Services     SETH ABBASI AH: Hadii aad ku hadasho Soomaali, waaxda luqadaha, qaybta kaalmada adeegyada, waxay robin haygiacomo orona. So [facility] [phone].    ATENCIÓN: Si habla español, tiene a paredes disposición servicios gratuitos de asistencia lingüística. Llame al [phone].    We comply with applicable federal civil rights laws and [place] laws. We do not discriminate on the basis of race, color, national origin, age, disability, sex, sexual orientation, or gender identity.            Thank you!     Thank you for choosing [facility]  for your care. Our goal is always to provide you with excellent care. Hearing back from our patients is  one way we can continue to improve our services. Please take a few minutes to complete the written survey that you may receive in the mail after your visit with us. Thank you!             Your Updated Medication List - Protect others around you: Learn how to safely use, store and throw away your medicines at www.disposemymeds.org.          This list is accurate as of 5/15/18  1:18 PM.  Always use your most recent med list.                   Brand Name Dispense Instructions for use Diagnosis    clindamycin 1 % topical gel    CLINDAMAX    60 g    Apply topically 2 times daily    Other acne       EPINEPHrine 0.3 MG/0.3ML injection 2-pack    EPIPEN 2-TRES    2 each    Inject 0.3 mLs (0.3 mg) into the muscle once as needed for anaphylaxis    Allergy to insect bites and stings       lactobacillus Tabs      Take 1 tablet by mouth daily.        LORazepam 0.5 MG tablet    ATIVAN    16 tablet    Take 1-2 tablets (0.5-1 mg) by mouth every 8 hours as needed for anxiety    Generalised anxiety disorder       QUEtiapine 50 MG tablet    SEROquel    90 tablet    TAKE ONE TABLET BY MOUTH AT BEDTIME    Major depressive disorder, recurrent episode, mild (H)       sertraline 100 MG tablet    ZOLOFT    90 tablet    Take 1 tablet (100 mg) by mouth daily    Major depressive disorder, recurrent episode, mild (H)

## 2018-05-16 ASSESSMENT — PATIENT HEALTH QUESTIONNAIRE - PHQ9: SUM OF ALL RESPONSES TO PHQ QUESTIONS 1-9: 0

## 2018-05-16 ASSESSMENT — ANXIETY QUESTIONNAIRES: GAD7 TOTAL SCORE: 0

## 2018-07-24 ENCOUNTER — TELEPHONE (OUTPATIENT)
Dept: FAMILY MEDICINE | Facility: OTHER | Age: 53
End: 2018-07-24

## 2018-07-24 NOTE — TELEPHONE ENCOUNTER
Called pt to let her know the records she requested are ready to be picked up at [facility].

## 2018-07-24 NOTE — TELEPHONE ENCOUNTER
Reason for Call:  Form, our goal is to have forms completed with 72 hours, however, some forms may require a visit or additional information.    Type of letter, form or note:  Jury Duty note     Who is the form from?: Requesting a note  (if other please explain)    Where did the form come from: Patient or family brought in       What clinic location was the form placed at?: [place]    Where the form was placed: NA    What number is listed as a contact on the form?:         Additional comments:  Nilsa is requesting a note to excuse her from jury duty starting in August due to knee pain. She  currently has no insurance and waiting for insurance to start 9/1 or she would have have a cortisone injection already. She is unable to afford the visit and injection at this time.     Call taken on 7/24/2018 at 3:05 PM by Belkys Chance

## 2018-07-24 NOTE — LETTER
July 27, 2018      Nilsa Chance  [address] [place] [id number]        To Whom It May Concern:    Nilsa Chance is under my care for left knee pain.  Please excuse her from jury duty.        Sincerely,            Marilee Phoenix MD

## 2018-07-25 NOTE — TELEPHONE ENCOUNTER
I cannot excuse her from jury duty due to knee pain. She has seen Dr. Phoenix for this pain recently, perhaps she can help her.     Electronically signed by Leanne Fitzpatrick CNP.

## 2018-07-26 NOTE — TELEPHONE ENCOUNTER
Informed pt that Leanne Fitzpatrick cannot give her a note. Pt aware that this note will be forwarded to Dr. Phoenix's office.  ................Malick Yung LPN,   July 26, 2018,      4:18 PM,   [facility]

## 2018-07-27 NOTE — TELEPHONE ENCOUNTER
Created note. Will place downstairs at  today of specialty center.  Janeth Chung RN on 7/27/2018 at 4:19 PM

## 2019-04-04 ENCOUNTER — TELEPHONE (OUTPATIENT)
Dept: FAMILY MEDICINE | Facility: OTHER | Age: 54
End: 2019-04-04

## 2019-04-04 NOTE — LETTER
[facility]  [address] [facility] [id number] [phone]        Nilsa Chance  [address] [facility] [address]      April 18, 2019      Dear Nilsa,    I care about your health and have reviewed your health plan, including your medical conditions, medication list, and lab results and am making recommendations based on this review, to better manage your health.    You are in particular need of attention regarding:  -Depression  -Breast Cancer Screening  -Colon Cancer Screening    I am recommending that you:  -schedule a MAMMOGRAM which is due. You can call  [phone] to schedule your mammogram.    -schedule a COLONOSCOPY.  Colon cancer is now the second leading cause of cancer-related deaths in the [place] for both men and women.  There are over 130,000 new cases and 50,000 deaths per year from colon cancer.  A recent study, which included patients ages 55 to 79 found 50,400 American deaths from colorectal cancer could have been prevented if patients had undergone a colonoscopy in the previous 10 years.    If you have not had a colonoscopy, we encourage you to schedule by contacting us at [phone], Monday through Friday.  After hours, you may leave a message and we will return your call during normal business hours.      There is another option called a FIT test, if you don t wish to have a colonoscopy, which needs to be repeated every year.  It does replace the colonoscopy for colorectal cancer screening and can detect hidden bleeding in the lower colon.  If a positive result is obtained, you would be referred for a colonoscopy. Please discuss this option with your provider.      For patients under/uninsured, we recommend you contact the [employer] program. [employer] [facility] is a free colorectal cancer screening program that provides colonoscopies for eligible under/uninsured [place] men and women. If you are interested in receiving a free colonoscopy, please  call Sukh Trells at [phone] (mention code ScopesWeb) to see if you re eligible.        If you've had the preventative screening completed at another facility or feel you're not due for this screening, please call our clinic at the number listed above or send us a My Chart message so we can update our records. We would like to thank you in advance for taking the time to take care of your health.  If you have any questions, please don t hesitate to contact our clinic.    Sincerely,       Your [facility] Team

## 2019-04-04 NOTE — TELEPHONE ENCOUNTER
Panel Management Review      Patient has the following on her problem list:     Depression / Dysthymia review    Measure:  Needs PHQ-9 score of 4 or less during index window.  Administer PHQ-9 and if score is 5 or more, send encounter to provider for next steps.    5 - 7 month window range:     PHQ-9 SCORE 4/26/2016 10/9/2017 5/15/2018   PHQ-9 Total Score - - -   PHQ-9 Total Score MyChart - 1 (Minimal depression) -   PHQ-9 Total Score 5 1 0       If PHQ-9 recheck is 5 or more, route to provider for next steps.    Patient is due for:  PHQ9      Composite cancer screening  Chart review shows that this patient is due/due soon for the following Mammogram and Fecal Colorectal (FIT)  Summary:    Patient is due/failing the following:   FIT, MAMMOGRAM and PHQ9    Action needed:   Patient needs office visit for Physical ., Patient needs to do PHQ9. and Patient needs referral/order: FIT and Mammo    Type of outreach:    Sent [employer] message.    Questions for provider review:    None                                                                                                                                    Cindy Sim MA

## 2019-05-16 DIAGNOSIS — F41.1 GENERALIZED ANXIETY DISORDER: ICD-10-CM

## 2019-05-16 DIAGNOSIS — F33.0 MAJOR DEPRESSIVE DISORDER, RECURRENT EPISODE, MILD (H): ICD-10-CM

## 2019-05-16 RX ORDER — SERTRALINE HYDROCHLORIDE 100 MG/1
TABLET, FILM COATED ORAL
Qty: 30 TABLET | Refills: 0 | Status: SHIPPED | OUTPATIENT
Start: 2019-05-16 | End: 2019-06-16

## 2019-05-16 NOTE — TELEPHONE ENCOUNTER
Medication is being filled for 1 time refill only due to:  Patient needs to be seen because it has been more than one year since last visit.     Will route to scheduling to call and schedule.     MICHELE AllenN, RN  [facility]

## 2019-05-16 NOTE — TELEPHONE ENCOUNTER
"Requested Prescriptions   Pending Prescriptions Disp Refills     sertraline (ZOLOFT) 100 MG tablet [Pharmacy Med Name: Sertraline Hcl 100 Mg Tab Nort] 90 tablet 0     Sig: TAKE ONE TABLET BY MOUTH ONE TIME DAILY   Last Written Prescription Date:  5/15/18  Last Fill Quantity: 90,  # refills: 3   Last office visit: 5/15/2018 with prescribing provider:  5/15/18   Future Office Visit:        SSRIs Protocol Failed - 5/16/2019  3:37 AM        Failed - PHQ-9 score less than 5 in past 6 months     Please review last PHQ-9 score.   PHQ-9 score:    PHQ-9 SCORE 5/15/2018   PHQ-9 Total Score -   PHQ-9 Total Score MyChart -   PHQ-9 Total Score 0           Failed - Recent (6 mo) or future (30 days) visit within the authorizing provider's specialty     Patient had office visit in the last 6 months or has a visit in the next 30 days with authorizing provider or within the authorizing provider's specialty.  See \"Patient Info\" tab in inbasket, or \"Choose Columns\" in Meds & Orders section of the refill encounter.            Passed - Medication is active on med list        Passed - Patient is age 18 or older        Passed - No active pregnancy on record        Passed - No positive pregnancy test in last 12 months        "

## 2019-12-15 ENCOUNTER — HEALTH MAINTENANCE LETTER (OUTPATIENT)
Age: 54
End: 2019-12-15

## 2021-01-15 ENCOUNTER — HEALTH MAINTENANCE LETTER (OUTPATIENT)
Age: 56
End: 2021-01-15

## 2021-03-20 ENCOUNTER — HEALTH MAINTENANCE LETTER (OUTPATIENT)
Age: 56
End: 2021-03-20

## 2021-05-07 ENCOUNTER — OFFICE VISIT (OUTPATIENT)
Dept: FAMILY MEDICINE | Facility: OTHER | Age: 56
End: 2021-05-07

## 2021-05-07 VITALS
OXYGEN SATURATION: 96 % | HEART RATE: 80 BPM | SYSTOLIC BLOOD PRESSURE: 110 MMHG | DIASTOLIC BLOOD PRESSURE: 80 MMHG | TEMPERATURE: 97.3 F | RESPIRATION RATE: 14 BRPM

## 2021-05-07 DIAGNOSIS — F33.0 MAJOR DEPRESSIVE DISORDER, RECURRENT EPISODE, MILD (H): ICD-10-CM

## 2021-05-07 DIAGNOSIS — Z23 NEED FOR VACCINATION: ICD-10-CM

## 2021-05-07 DIAGNOSIS — Z11.4 SCREENING FOR HIV (HUMAN IMMUNODEFICIENCY VIRUS): Primary | ICD-10-CM

## 2021-05-07 DIAGNOSIS — F41.1 GENERALIZED ANXIETY DISORDER: ICD-10-CM

## 2021-05-07 PROCEDURE — 99203 OFFICE O/P NEW LOW 30 MIN: CPT | Performed by: PHYSICIAN ASSISTANT

## 2021-05-07 RX ORDER — QUETIAPINE FUMARATE 50 MG/1
50 TABLET, FILM COATED ORAL AT BEDTIME
Qty: 90 TABLET | Refills: 3 | Status: SHIPPED | OUTPATIENT
Start: 2021-05-07 | End: 2022-04-18

## 2021-05-07 RX ORDER — SERTRALINE HYDROCHLORIDE 100 MG/1
100 TABLET, FILM COATED ORAL DAILY
Qty: 90 TABLET | Refills: 3 | Status: SHIPPED | OUTPATIENT
Start: 2021-05-07 | End: 2022-04-07

## 2021-05-07 ASSESSMENT — ANXIETY QUESTIONNAIRES
7. FEELING AFRAID AS IF SOMETHING AWFUL MIGHT HAPPEN: SEVERAL DAYS
4. TROUBLE RELAXING: NOT AT ALL
GAD7 TOTAL SCORE: 4
1. FEELING NERVOUS, ANXIOUS, OR ON EDGE: SEVERAL DAYS
GAD7 TOTAL SCORE: 4
6. BECOMING EASILY ANNOYED OR IRRITABLE: NOT AT ALL
7. FEELING AFRAID AS IF SOMETHING AWFUL MIGHT HAPPEN: SEVERAL DAYS
GAD7 TOTAL SCORE: 4
3. WORRYING TOO MUCH ABOUT DIFFERENT THINGS: SEVERAL DAYS
5. BEING SO RESTLESS THAT IT IS HARD TO SIT STILL: NOT AT ALL
2. NOT BEING ABLE TO STOP OR CONTROL WORRYING: SEVERAL DAYS

## 2021-05-07 ASSESSMENT — PATIENT HEALTH QUESTIONNAIRE - PHQ9
10. IF YOU CHECKED OFF ANY PROBLEMS, HOW DIFFICULT HAVE THESE PROBLEMS MADE IT FOR YOU TO DO YOUR WORK, TAKE CARE OF THINGS AT HOME, OR GET ALONG WITH OTHER PEOPLE: NOT DIFFICULT AT ALL
SUM OF ALL RESPONSES TO PHQ QUESTIONS 1-9: 1
SUM OF ALL RESPONSES TO PHQ QUESTIONS 1-9: 1

## 2021-05-07 NOTE — PROGRESS NOTES
Assessment & Plan   1. Major depressive disorder, recurrent episode, mild (H)  2. Generalized anxiety disorder  Patient has no insurance and denies a placement for mental health referral.  Medication request granted.  Recently moved back to [place] from [place].  - MA SCREENING DIGITAL BILAT - Future  (s+30); Future  - sertraline (ZOLOFT) 100 MG tablet; Take 1 tablet (100 mg) by mouth daily  Dispense: 90 tablet; Refill: 3  - QUEtiapine (SEROQUEL) 50 MG tablet; Take 1 tablet (50 mg) by mouth At Bedtime  Dispense: 90 tablet; Refill: 3  - Fecal colorectal cancer screen (FIT); Future    3. Need for vaccination  4. Screening for HIV (human immunodeficiency virus)  Deferred to full female physical in the near future.     Tobacco Cessation:   reports that she has been smoking cigarettes. She has a 10.00 pack-year smoking history. She has never used smokeless tobacco.  Tobacco Cessation Action Plan: Information offered: Patient not interested at this time    Work on weight loss  Regular exercise  Return in about 1 year (around 5/7/2022) for Mental Health, Medication Recheck.    Nick Barry PA-C  [facility]     Nilsa Chance is a 55 year old female who presents to clinic today for the following health issues:    History of Present Illness       Mental Health Follow-up:  Patient presents to follow-up on Depression & Anxiety.Patient's depression since last visit has been:  Good  The patient is not having other symptoms associated with depression.  Patient's anxiety since last visit has been:  Good  The patient is not having other symptoms associated with anxiety.  Any significant life events: housing concerns  Patient is not feeling anxious or having panic attacks.  Patient has no concerns about alcohol or drug use.     Social History  Tobacco Use    Smoking status: Current Every Day Smoker      Packs/day: 0.50      Years: 20.00      Pack years: 10      Types: Cigarettes     Smokeless tobacco: Never Used  Alcohol use: No  Drug use: Yes    Types: Marijuana    Comment: rarely      Today's PHQ-9         PHQ-9 Total Score:     (P) 1   PHQ-9 Q9 Thoughts of better off dead/self-harm past 2 weeks :   (P) Not at all   Thoughts of suicide or self harm:      Self-harm Plan:        Self-harm Action:          Safety concerns for self or others:           She eats 2-3 servings of fruits and vegetables daily.She consumes 3 sweetened beverage(s) daily.She exercises with enough effort to increase her heart rate 10 to 19 minutes per day.  She exercises with enough effort to increase her heart rate 3 or less days per week.   She is taking medications regularly.     Review of Systems   Constitutional, HEENT, cardiovascular, pulmonary, GI, , musculoskeletal, neuro, skin, endocrine and psych systems are negative, except as otherwise noted.      Objective    /80   Pulse 80   Temp 97.3  F (36.3  C) (Temporal)   Resp 14   SpO2 96%   There is no height or weight on file to calculate BMI.  Physical Exam   GENERAL: healthy, alert and no distress  NECK: no adenopathy, no asymmetry, masses, or scars and thyroid normal to palpation  RESP: lungs clear to auscultation - no rales, rhonchi or wheezes  CV: regular rate and rhythm, normal S1 S2, no S3 or S4, no murmur, click or rub, no peripheral edema and peripheral pulses strong  ABDOMEN: soft, nontender, no hepatosplenomegaly, no masses and bowel sounds normal  MS: no gross musculoskeletal defects noted, no edema  SKIN: no suspicious lesions or rashes to exposed visible skin today.  Solar exposure is quite obvious.  Counseled against this in the near future.  NEURO: Normal strength and tone, mentation intact and speech normal  PSYCH: mentation appears normal, affect normal/bright    No results found for this or any previous visit (from the past 24 hour(s)).      Answers for HPI/ROS submitted by the patient on 5/7/2021   Chronic problems general questions  HPI Form  If you checked off any problems, how difficult have these problems made it for you to do your work, take care of things at home, or get along with other people?: Not difficult at all  PHQ9 TOTAL SCORE: 1  TERRY 7 TOTAL SCORE: 4

## 2021-05-08 ASSESSMENT — PATIENT HEALTH QUESTIONNAIRE - PHQ9: SUM OF ALL RESPONSES TO PHQ QUESTIONS 1-9: 1

## 2021-05-08 ASSESSMENT — ANXIETY QUESTIONNAIRES: GAD7 TOTAL SCORE: 4

## 2021-09-04 ENCOUNTER — HEALTH MAINTENANCE LETTER (OUTPATIENT)
Age: 56
End: 2021-09-04

## 2021-10-29 ENCOUNTER — TELEPHONE (OUTPATIENT)
Dept: FAMILY MEDICINE | Facility: OTHER | Age: 56
End: 2021-10-29

## 2022-02-19 ENCOUNTER — HEALTH MAINTENANCE LETTER (OUTPATIENT)
Age: 57
End: 2022-02-19

## 2022-04-07 DIAGNOSIS — F41.1 GENERALIZED ANXIETY DISORDER: ICD-10-CM

## 2022-04-07 DIAGNOSIS — F33.0 MAJOR DEPRESSIVE DISORDER, RECURRENT EPISODE, MILD (H): ICD-10-CM

## 2022-04-07 RX ORDER — SERTRALINE HYDROCHLORIDE 100 MG/1
TABLET, FILM COATED ORAL
Qty: 30 TABLET | Refills: 0 | Status: SHIPPED | OUTPATIENT
Start: 2022-04-07 | End: 2022-07-01

## 2022-04-16 ENCOUNTER — HEALTH MAINTENANCE LETTER (OUTPATIENT)
Age: 57
End: 2022-04-16

## 2022-04-16 DIAGNOSIS — F41.1 GENERALIZED ANXIETY DISORDER: ICD-10-CM

## 2022-04-16 DIAGNOSIS — F33.0 MAJOR DEPRESSIVE DISORDER, RECURRENT EPISODE, MILD (H): ICD-10-CM

## 2022-04-18 RX ORDER — QUETIAPINE FUMARATE 50 MG/1
TABLET, FILM COATED ORAL
Qty: 30 TABLET | Refills: 0 | Status: SHIPPED | OUTPATIENT
Start: 2022-04-18 | End: 2022-04-27

## 2022-04-18 NOTE — TELEPHONE ENCOUNTER
Needs to establish care.  No further refills without office visit.  Electronically signed:    Nick Barry PA-C

## 2022-04-20 DIAGNOSIS — F41.1 GENERALIZED ANXIETY DISORDER: ICD-10-CM

## 2022-04-20 DIAGNOSIS — F33.0 MAJOR DEPRESSIVE DISORDER, RECURRENT EPISODE, MILD (H): ICD-10-CM

## 2022-04-20 RX ORDER — QUETIAPINE FUMARATE 50 MG/1
TABLET, FILM COATED ORAL
Qty: 30 TABLET | Refills: 0 | OUTPATIENT
Start: 2022-04-20

## 2022-04-27 DIAGNOSIS — F41.1 GENERALIZED ANXIETY DISORDER: ICD-10-CM

## 2022-04-27 DIAGNOSIS — F33.0 MAJOR DEPRESSIVE DISORDER, RECURRENT EPISODE, MILD (H): ICD-10-CM

## 2022-04-28 RX ORDER — QUETIAPINE FUMARATE 50 MG/1
50 TABLET, FILM COATED ORAL AT BEDTIME
Qty: 90 TABLET | Refills: 3 | Status: SHIPPED | OUTPATIENT
Start: 2022-04-28 | End: 2022-07-01

## 2022-07-01 ENCOUNTER — VIRTUAL VISIT (OUTPATIENT)
Dept: FAMILY MEDICINE | Facility: OTHER | Age: 57
End: 2022-07-01

## 2022-07-01 DIAGNOSIS — L40.9 PSORIASIS: ICD-10-CM

## 2022-07-01 DIAGNOSIS — F33.0 MAJOR DEPRESSIVE DISORDER, RECURRENT EPISODE, MILD (H): ICD-10-CM

## 2022-07-01 DIAGNOSIS — L20.84 INTRINSIC ECZEMA: ICD-10-CM

## 2022-07-01 DIAGNOSIS — Z91.89 PNEUMOCOCCAL VACCINATION INDICATED: Primary | ICD-10-CM

## 2022-07-01 DIAGNOSIS — Z11.4 SCREENING FOR HIV (HUMAN IMMUNODEFICIENCY VIRUS): ICD-10-CM

## 2022-07-01 DIAGNOSIS — F41.1 GENERALIZED ANXIETY DISORDER: ICD-10-CM

## 2022-07-01 DIAGNOSIS — Z12.11 SCREEN FOR COLON CANCER: ICD-10-CM

## 2022-07-01 DIAGNOSIS — Z12.31 VISIT FOR SCREENING MAMMOGRAM: ICD-10-CM

## 2022-07-01 PROCEDURE — 99213 OFFICE O/P EST LOW 20 MIN: CPT | Mod: 95 | Performed by: PHYSICIAN ASSISTANT

## 2022-07-01 RX ORDER — QUETIAPINE FUMARATE 50 MG/1
50 TABLET, FILM COATED ORAL AT BEDTIME
Qty: 90 TABLET | Refills: 3 | Status: SHIPPED | OUTPATIENT
Start: 2022-07-01 | End: 2023-06-30

## 2022-07-01 RX ORDER — BETAMETHASONE DIPROPIONATE 0.5 MG/G
CREAM TOPICAL 2 TIMES DAILY
Qty: 50 G | Refills: 1 | Status: SHIPPED | OUTPATIENT
Start: 2022-07-01 | End: 2023-06-30

## 2022-07-01 RX ORDER — SERTRALINE HYDROCHLORIDE 100 MG/1
100 TABLET, FILM COATED ORAL DAILY
Qty: 90 TABLET | Refills: 3 | Status: SHIPPED | OUTPATIENT
Start: 2022-07-01 | End: 2023-06-30

## 2022-07-01 ASSESSMENT — ANXIETY QUESTIONNAIRES
3. WORRYING TOO MUCH ABOUT DIFFERENT THINGS: MORE THAN HALF THE DAYS
IF YOU CHECKED OFF ANY PROBLEMS ON THIS QUESTIONNAIRE, HOW DIFFICULT HAVE THESE PROBLEMS MADE IT FOR YOU TO DO YOUR WORK, TAKE CARE OF THINGS AT HOME, OR GET ALONG WITH OTHER PEOPLE: NOT DIFFICULT AT ALL
1. FEELING NERVOUS, ANXIOUS, OR ON EDGE: SEVERAL DAYS
GAD7 TOTAL SCORE: 4
GAD7 TOTAL SCORE: 4
6. BECOMING EASILY ANNOYED OR IRRITABLE: NOT AT ALL
5. BEING SO RESTLESS THAT IT IS HARD TO SIT STILL: NOT AT ALL
7. FEELING AFRAID AS IF SOMETHING AWFUL MIGHT HAPPEN: NOT AT ALL
2. NOT BEING ABLE TO STOP OR CONTROL WORRYING: SEVERAL DAYS

## 2022-07-01 ASSESSMENT — PATIENT HEALTH QUESTIONNAIRE - PHQ9
5. POOR APPETITE OR OVEREATING: NOT AT ALL
SUM OF ALL RESPONSES TO PHQ QUESTIONS 1-9: 1

## 2022-07-01 NOTE — PROGRESS NOTES
Nilsa is a 56 year old who is being evaluated via a billable video visit.      How would you like to obtain your AVS? MyChart  If the video visit is dropped, the invitation should be resent by: Text to cell phone: [phone]  Will anyone else be joining your video visit? No    Assessment & Plan     Major depressive disorder, recurrent episode, mild (H)  Generalized anxiety disorder  She states that she is doing well this point time on these current offices and would like a refill.  No further concerns for counseling though we did discuss it today for her.  Follow-up in 3 to 4 weeks face-to-face for vitals etc. since we have not seen her in over a year.  - QUEtiapine (SEROQUEL) 50 MG tablet; Take 1 tablet (50 mg) by mouth At Bedtime  - sertraline (ZOLOFT) 100 MG tablet; Take 1 tablet (100 mg) by mouth daily    Intrinsic eczema  Psoriasis  She tries to show me a rash to the left anterior chest just below her collarbone via video visit today but quite frankly the video definition is very poor.  By her description I suspect that this may indeed be psoriasis but advised medication as noted below and follow-up in 3 to 4 weeks.  - augmented betamethasone dipropionate (DIPROLENE AF) 0.05 % external cream; Apply topically 2 times daily    Screening for HIV (human immunodeficiency virus)  Screening advised but she declines today.  She considers herself low risk.    Visit for screening mammogram  Due for screening mammogram.  Orders placed.  She is given related phone numbers.  - MA SCREENING DIGITAL BILAT - Future  (s+30); Future    Screen for colon cancer  - Fecal colorectal cancer screen FIT - Future (S+30); Future    Pneumococcal vaccination indicated  - Pneumococcal 20 Valent Conjugate (Prevnar 20); Future     Tobacco Cessation:   reports that she has been smoking cigarettes. She has a 10.00 pack-year smoking history. She has never used smokeless tobacco.  Tobacco Cessation Action Plan: Information offered: Patient not  interested at this time    Regular exercise  No follow-ups on file.    Nick Barry PA-C  [facility] FELECIA Ash is a 56 year old, presenting for the following health issues:  No chief complaint on file.      HPI     Depression and Anxiety Follow-Up    How are you doing with your depression since your last visit? No change    How are you doing with your anxiety since your last visit?  Worsened not every day situational    Are you having other symptoms that might be associated with depression or anxiety? No    Have you had a significant life event? No     Do you have any concerns with your use of alcohol or other drugs? No    Social History     Tobacco Use     Smoking status: Current Every Day Smoker     Packs/day: 0.50     Years: 20.00     Pack years: 10.00     Types: Cigarettes     Smokeless tobacco: Never Used   Substance Use Topics     Alcohol use: No     Drug use: Yes     Types: Marijuana     Comment: rarely     PHQ 10/9/2017 5/15/2018 5/7/2021   PHQ-9 Total Score 1 0 1   Q9: Thoughts of better off dead/self-harm past 2 weeks Not at all Not at all Not at all     TERRY-7 SCORE 10/9/2017 5/15/2018 5/7/2021   Total Score 3 (minimal anxiety) - 4 (minimal anxiety)   Total Score 3 0 4     Last PHQ-9 7/1/2022   1.  Little interest or pleasure in doing things 0   2.  Feeling down, depressed, or hopeless 1   3.  Trouble falling or staying asleep, or sleeping too much 0   4.  Feeling tired or having little energy 0   5.  Poor appetite or overeating 0   6.  Feeling bad about yourself 0   7.  Trouble concentrating 0   8.  Moving slowly or restless 0   Q9: Thoughts of better off dead/self-harm past 2 weeks 0   PHQ-9 Total Score 1   Difficulty at work, home, or with people Not difficult at all     TERRY-7  7/1/2022   1. Feeling nervous, anxious, or on edge 1   2. Not being able to stop or control worrying 1   3. Worrying too much about different things 2   4. Trouble relaxing 0   5. Being so  restless that it is hard to sit still 0   6. Becoming easily annoyed or irritable 0   7. Feeling afraid, as if something awful might happen 0   TERRY-7 Total Score 4   If you checked any problems, how difficult have they made it for you to do your work, take care of things at home, or get along with other people? Not difficult at all       Suicide Assessment Five-step Evaluation and Treatment (SAFE-T)      How many servings of fruits and vegetables do you eat daily?  2-3    On average, how many sweetened beverages do you drink each day (Examples: soda, juice, sweet tea, etc.  Do NOT count diet or artificially sweetened beverages)?   3    How many days per week do you exercise enough to make your heart beat faster? 3 or less    How many minutes a day do you exercise enough to make your heart beat faster? 20 - 29    How many days per week do you miss taking your medication? 0        Review of Systems   Constitutional, HEENT, cardiovascular, pulmonary, GI, , musculoskeletal, neuro, skin, endocrine and psych systems are negative, except as otherwise noted.      Objective           Vitals:  No vitals were obtained today due to virtual visit.    Physical Exam   GENERAL: Healthy, alert and no distress  EYES: Eyes grossly normal to inspection.  No discharge or erythema, or obvious scleral/conjunctival abnormalities.  RESP: No audible wheeze, cough, or visible cyanosis.  No visible retractions or increased work of breathing.    SKIN: Visible skin clear. No significant rash, abnormal pigmentation or lesions.  NEURO: Cranial nerves grossly intact.  Mentation and speech appropriate for age.  PSYCH: Mentation appears normal, affect normal/bright, judgement and insight intact, normal speech and appearance well-groomed.    No results found for this or any previous visit (from the past 24 hour(s)).            Video-Visit Details    Video Start Time: 0530    Type of service:  Video Visit    Video End Time:3:42 PM    Originating  Location (pt. Location): Home  - her travel trailer  Distant Location (provider location):  [facility]     Platform used for Video Visit: Giovany Koehler

## 2022-09-25 NOTE — TELEPHONE ENCOUNTER
Patient Quality Outreach Summary      Summary:    Patient is due/failing the following:   Colonoscopy, Breast Cancer Screening - Mammogram and Physical     Type of outreach:    Sent [employer] message.    Questions for provider review:    None                                                                                                                    [place] CMA       Chart routed to Care Team.    
VIEW ALL

## 2022-10-22 ENCOUNTER — HEALTH MAINTENANCE LETTER (OUTPATIENT)
Age: 57
End: 2022-10-22

## 2023-04-01 ENCOUNTER — HEALTH MAINTENANCE LETTER (OUTPATIENT)
Age: 58
End: 2023-04-01

## 2023-06-01 ENCOUNTER — HEALTH MAINTENANCE LETTER (OUTPATIENT)
Age: 58
End: 2023-06-01

## 2023-06-26 ASSESSMENT — ENCOUNTER SYMPTOMS
NERVOUS/ANXIOUS: 0
WEAKNESS: 0
EYE PAIN: 0
BREAST MASS: 0
SHORTNESS OF BREATH: 0
ARTHRALGIAS: 0
DYSURIA: 0
FEVER: 0
HEADACHES: 0
HEMATOCHEZIA: 0
SORE THROAT: 0
CHILLS: 0
PARESTHESIAS: 0
JOINT SWELLING: 0
PALPITATIONS: 0
MYALGIAS: 1
HEMATURIA: 0
DIARRHEA: 0
COUGH: 0
FREQUENCY: 0
HEARTBURN: 0
NAUSEA: 0
CONSTIPATION: 0
ABDOMINAL PAIN: 0
DIZZINESS: 0

## 2023-06-30 ENCOUNTER — OFFICE VISIT (OUTPATIENT)
Dept: FAMILY MEDICINE | Facility: OTHER | Age: 58
End: 2023-06-30

## 2023-06-30 VITALS
TEMPERATURE: 97.3 F | DIASTOLIC BLOOD PRESSURE: 76 MMHG | WEIGHT: 157.5 LBS | BODY MASS INDEX: 26.89 KG/M2 | HEART RATE: 96 BPM | RESPIRATION RATE: 18 BRPM | HEIGHT: 64 IN | SYSTOLIC BLOOD PRESSURE: 122 MMHG | OXYGEN SATURATION: 97 %

## 2023-06-30 DIAGNOSIS — F33.0 MAJOR DEPRESSIVE DISORDER, RECURRENT EPISODE, MILD (H): ICD-10-CM

## 2023-06-30 DIAGNOSIS — N60.12 FIBROCYSTIC BREAST CHANGES OF BOTH BREASTS: ICD-10-CM

## 2023-06-30 DIAGNOSIS — Z00.00 ROUTINE HISTORY AND PHYSICAL EXAMINATION OF ADULT: Primary | ICD-10-CM

## 2023-06-30 DIAGNOSIS — E78.5 HYPERLIPIDEMIA LDL GOAL <100: ICD-10-CM

## 2023-06-30 DIAGNOSIS — N94.10 DYSPAREUNIA IN FEMALE: ICD-10-CM

## 2023-06-30 DIAGNOSIS — N95.2 ATROPHY OF VAGINA: ICD-10-CM

## 2023-06-30 DIAGNOSIS — F41.1 GENERALIZED ANXIETY DISORDER: ICD-10-CM

## 2023-06-30 DIAGNOSIS — N95.1 MENOPAUSAL SYNDROME (HOT FLASHES): ICD-10-CM

## 2023-06-30 DIAGNOSIS — N60.11 FIBROCYSTIC BREAST CHANGES OF BOTH BREASTS: ICD-10-CM

## 2023-06-30 DIAGNOSIS — M25.561 CHRONIC PAIN OF RIGHT KNEE: ICD-10-CM

## 2023-06-30 DIAGNOSIS — G89.29 CHRONIC PAIN OF RIGHT KNEE: ICD-10-CM

## 2023-06-30 DIAGNOSIS — Z12.11 SCREEN FOR COLON CANCER: ICD-10-CM

## 2023-06-30 DIAGNOSIS — L40.9 PSORIASIS: ICD-10-CM

## 2023-06-30 DIAGNOSIS — L20.84 INTRINSIC ECZEMA: ICD-10-CM

## 2023-06-30 DIAGNOSIS — M47.26 OSTEOARTHRITIS OF SPINE WITH RADICULOPATHY, LUMBAR REGION: ICD-10-CM

## 2023-06-30 DIAGNOSIS — Z12.4 CERVICAL CANCER SCREENING: ICD-10-CM

## 2023-06-30 LAB
ALBUMIN SERPL BCG-MCNC: 4.5 G/DL (ref 3.5–5.2)
ALP SERPL-CCNC: 77 U/L (ref 35–104)
ALT SERPL W P-5'-P-CCNC: 13 U/L (ref 0–50)
ANION GAP SERPL CALCULATED.3IONS-SCNC: 13 MMOL/L (ref 7–15)
AST SERPL W P-5'-P-CCNC: 22 U/L (ref 0–45)
BASOPHILS # BLD AUTO: 0 10E3/UL (ref 0–0.2)
BASOPHILS NFR BLD AUTO: 1 %
BILIRUB SERPL-MCNC: 0.2 MG/DL
BUN SERPL-MCNC: 11.4 MG/DL (ref 6–20)
CALCIUM SERPL-MCNC: 9.7 MG/DL (ref 8.6–10)
CHLORIDE SERPL-SCNC: 104 MMOL/L (ref 98–107)
CHOLEST SERPL-MCNC: 246 MG/DL
CREAT SERPL-MCNC: 0.71 MG/DL (ref 0.51–0.95)
DEPRECATED HCO3 PLAS-SCNC: 26 MMOL/L (ref 22–29)
EOSINOPHIL # BLD AUTO: 0.1 10E3/UL (ref 0–0.7)
EOSINOPHIL NFR BLD AUTO: 2 %
ERYTHROCYTE [DISTWIDTH] IN BLOOD BY AUTOMATED COUNT: 13.4 % (ref 10–15)
GFR SERPL CREATININE-BSD FRML MDRD: >90 ML/MIN/1.73M2
GLUCOSE SERPL-MCNC: 101 MG/DL (ref 70–99)
HCT VFR BLD AUTO: 40.4 % (ref 35–47)
HDLC SERPL-MCNC: 61 MG/DL
HGB BLD-MCNC: 13.2 G/DL (ref 11.7–15.7)
IMM GRANULOCYTES # BLD: 0 10E3/UL
IMM GRANULOCYTES NFR BLD: 0 %
LDLC SERPL CALC-MCNC: 166 MG/DL
LYMPHOCYTES # BLD AUTO: 1.6 10E3/UL (ref 0.8–5.3)
LYMPHOCYTES NFR BLD AUTO: 30 %
MCH RBC QN AUTO: 31.7 PG (ref 26.5–33)
MCHC RBC AUTO-ENTMCNC: 32.7 G/DL (ref 31.5–36.5)
MCV RBC AUTO: 97 FL (ref 78–100)
MONOCYTES # BLD AUTO: 0.2 10E3/UL (ref 0–1.3)
MONOCYTES NFR BLD AUTO: 4 %
NEUTROPHILS # BLD AUTO: 3.3 10E3/UL (ref 1.6–8.3)
NEUTROPHILS NFR BLD AUTO: 63 %
NONHDLC SERPL-MCNC: 185 MG/DL
PLATELET # BLD AUTO: 437 10E3/UL (ref 150–450)
POTASSIUM SERPL-SCNC: 3.9 MMOL/L (ref 3.4–5.3)
PROT SERPL-MCNC: 7.4 G/DL (ref 6.4–8.3)
RBC # BLD AUTO: 4.16 10E6/UL (ref 3.8–5.2)
SODIUM SERPL-SCNC: 143 MMOL/L (ref 136–145)
TRIGL SERPL-MCNC: 97 MG/DL
TSH SERPL DL<=0.005 MIU/L-ACNC: 2.79 UIU/ML (ref 0.3–4.2)
WBC # BLD AUTO: 5.3 10E3/UL (ref 4–11)

## 2023-06-30 PROCEDURE — 87624 HPV HI-RISK TYP POOLED RSLT: CPT | Performed by: PHYSICIAN ASSISTANT

## 2023-06-30 PROCEDURE — 36415 COLL VENOUS BLD VENIPUNCTURE: CPT | Performed by: PHYSICIAN ASSISTANT

## 2023-06-30 PROCEDURE — G0123 SCREEN CERV/VAG THIN LAYER: HCPCS | Performed by: PHYSICIAN ASSISTANT

## 2023-06-30 PROCEDURE — 80061 LIPID PANEL: CPT | Performed by: PHYSICIAN ASSISTANT

## 2023-06-30 PROCEDURE — 80050 GENERAL HEALTH PANEL: CPT | Performed by: PHYSICIAN ASSISTANT

## 2023-06-30 PROCEDURE — 99214 OFFICE O/P EST MOD 30 MIN: CPT | Mod: 25 | Performed by: PHYSICIAN ASSISTANT

## 2023-06-30 PROCEDURE — 99396 PREV VISIT EST AGE 40-64: CPT | Performed by: PHYSICIAN ASSISTANT

## 2023-06-30 RX ORDER — QUETIAPINE FUMARATE 50 MG/1
50 TABLET, FILM COATED ORAL AT BEDTIME
Qty: 90 TABLET | Refills: 3 | Status: SHIPPED | OUTPATIENT
Start: 2023-06-30 | End: 2024-04-29

## 2023-06-30 RX ORDER — SERTRALINE HYDROCHLORIDE 100 MG/1
100 TABLET, FILM COATED ORAL DAILY
Qty: 90 TABLET | Refills: 3 | Status: SHIPPED | OUTPATIENT
Start: 2023-06-30 | End: 2024-07-05

## 2023-06-30 RX ORDER — ASPIRIN 81 MG/1
81 TABLET ORAL DAILY
COMMUNITY

## 2023-06-30 RX ORDER — LORAZEPAM 0.5 MG/1
0.5 TABLET ORAL
COMMUNITY
End: 2023-06-30

## 2023-06-30 RX ORDER — LORAZEPAM 0.5 MG/1
0.5 TABLET ORAL EVERY 6 HOURS PRN
Qty: 10 TABLET | Refills: 0 | Status: SHIPPED | OUTPATIENT
Start: 2023-06-30 | End: 2024-01-25

## 2023-06-30 RX ORDER — CETIRIZINE HYDROCHLORIDE 10 MG/1
10 TABLET ORAL DAILY
COMMUNITY

## 2023-06-30 RX ORDER — BETAMETHASONE DIPROPIONATE 0.5 MG/G
CREAM TOPICAL 2 TIMES DAILY
Qty: 50 G | Refills: 1 | Status: SHIPPED | OUTPATIENT
Start: 2023-06-30

## 2023-06-30 ASSESSMENT — ENCOUNTER SYMPTOMS
FREQUENCY: 0
EYE PAIN: 0
DIZZINESS: 0
MYALGIAS: 1
PALPITATIONS: 0
FEVER: 0
ABDOMINAL PAIN: 0
CONSTIPATION: 0
HEMATURIA: 0
DYSURIA: 0
HEMATOCHEZIA: 0
COUGH: 0
CHILLS: 0
SHORTNESS OF BREATH: 0
NERVOUS/ANXIOUS: 0
PARESTHESIAS: 0
BREAST MASS: 0
HEARTBURN: 0
WEAKNESS: 0
SORE THROAT: 0
ARTHRALGIAS: 0
DIARRHEA: 0
JOINT SWELLING: 0
HEADACHES: 0
NAUSEA: 0

## 2023-06-30 ASSESSMENT — ANXIETY QUESTIONNAIRES
7. FEELING AFRAID AS IF SOMETHING AWFUL MIGHT HAPPEN: SEVERAL DAYS
2. NOT BEING ABLE TO STOP OR CONTROL WORRYING: NEARLY EVERY DAY
GAD7 TOTAL SCORE: 12
GAD7 TOTAL SCORE: 12
6. BECOMING EASILY ANNOYED OR IRRITABLE: NEARLY EVERY DAY
3. WORRYING TOO MUCH ABOUT DIFFERENT THINGS: NEARLY EVERY DAY
1. FEELING NERVOUS, ANXIOUS, OR ON EDGE: MORE THAN HALF THE DAYS
IF YOU CHECKED OFF ANY PROBLEMS ON THIS QUESTIONNAIRE, HOW DIFFICULT HAVE THESE PROBLEMS MADE IT FOR YOU TO DO YOUR WORK, TAKE CARE OF THINGS AT HOME, OR GET ALONG WITH OTHER PEOPLE: SOMEWHAT DIFFICULT
5. BEING SO RESTLESS THAT IT IS HARD TO SIT STILL: NOT AT ALL

## 2023-06-30 ASSESSMENT — PATIENT HEALTH QUESTIONNAIRE - PHQ9
10. IF YOU CHECKED OFF ANY PROBLEMS, HOW DIFFICULT HAVE THESE PROBLEMS MADE IT FOR YOU TO DO YOUR WORK, TAKE CARE OF THINGS AT HOME, OR GET ALONG WITH OTHER PEOPLE: SOMEWHAT DIFFICULT
SUM OF ALL RESPONSES TO PHQ QUESTIONS 1-9: 3
SUM OF ALL RESPONSES TO PHQ QUESTIONS 1-9: 3
5. POOR APPETITE OR OVEREATING: NOT AT ALL

## 2023-06-30 NOTE — PROGRESS NOTES
SUBJECTIVE:   CC: Nilsa is an 57 year old who presents for preventive health visit.       2023     1:12 PM   Additional Questions   Roomed by Alanis JOLLY   Accompanied by self     Healthy Habits:     Getting at least 3 servings of Calcium per day:  Yes    Bi-annual eye exam:  NO    Dental care twice a year:  NO    Sleep apnea or symptoms of sleep apnea:  None    Diet:  Regular (no restrictions)    Frequency of exercise:  2-3 days/week    Duration of exercise:  30-45 minutes    Taking medications regularly:  Yes    Medication side effects:  None    Wants 1 MG on Ativan if possible, uses PRN. Wondering if she should increase the Sertraline as well.    Today's PHQ-9 Score:       2023     1:05 PM   PHQ-9 SCORE   PHQ-9 Total Score MyChart 3 (Minimal depression)   PHQ-9 Total Score 3           Depression and Anxiety Follow-Up    How are you doing with your depression since your last visit? No change    How are you doing with your anxiety since your last visit?  Worsened stressed right now    Are you having other symptoms that might be associated with depression or anxiety? No    Have you had a significant life event? Financial Concerns     Do you have any concerns with your use of alcohol or other drugs? No    Social History     Tobacco Use     Smoking status: Former     Packs/day: 0.50     Years: 20.00     Pack years: 10.00     Types: Cigarettes     Quit date: 2022     Years since quittin.9     Smokeless tobacco: Never   Vaping Use     Vaping Use: Never used   Substance Use Topics     Alcohol use: No     Drug use: Yes     Types: Marijuana     Comment: rarely         2021     1:45 PM 2022    12:51 PM 2023     1:05 PM   PHQ   PHQ-9 Total Score 1 1 3   Q9: Thoughts of better off dead/self-harm past 2 weeks Not at all Not at all Not at all         2021     1:46 PM 2022    12:51 PM 2023     1:21 PM   TERRY-7 SCORE   Total Score 4 (minimal anxiety)     Total Score 4 4 12          2023     1:05 PM   Last PHQ-9   1.  Little interest or pleasure in doing things 1   2.  Feeling down, depressed, or hopeless 1   3.  Trouble falling or staying asleep, or sleeping too much 0   4.  Feeling tired or having little energy 0   5.  Poor appetite or overeating 0   6.  Feeling bad about yourself 1   7.  Trouble concentrating 0   8.  Moving slowly or restless 0   Q9: Thoughts of better off dead/self-harm past 2 weeks 0   PHQ-9 Total Score 3         2023     1:21 PM   TERRY-7    1. Feeling nervous, anxious, or on edge 2   2. Not being able to stop or control worrying 3   3. Worrying too much about different things 3   4. Trouble relaxing 0   5. Being so restless that it is hard to sit still 0   6. Becoming easily annoyed or irritable 3   7. Feeling afraid, as if something awful might happen 1   TERRY-7 Total Score 12   If you checked any problems, how difficult have they made it for you to do your work, take care of things at home, or get along with other people? Somewhat difficult       Suicide Assessment Five-step Evaluation and Treatment (SAFE-T)  Have you ever done Advance Care Planning? (For example, a Health Directive, POLST, or a discussion with a medical provider or your loved ones about your wishes): No, advance care planning information given to patient to review.  Patient plans to discuss their wishes with loved ones or provider.      Social History     Tobacco Use     Smoking status: Former     Packs/day: 0.50     Years: 20.00     Pack years: 10.00     Types: Cigarettes     Quit date: 2022     Years since quittin.9     Smokeless tobacco: Never   Substance Use Topics     Alcohol use: No             2023     1:19 PM   Alcohol Use   Prescreen: >3 drinks/day or >7 drinks/week? No     Reviewed orders with patient.  Reviewed health maintenance and updated orders accordingly - Yes  Lab work is in process  Labs reviewed in EPIC  BP Readings from Last 3 Encounters:   23 122/76    21 110/80   05/15/18 114/64    Wt Readings from Last 3 Encounters:   23 71.4 kg (157 lb 8 oz)   05/15/18 62.1 kg (137 lb)   18 61.2 kg (135 lb)                  Patient Active Problem List   Diagnosis     Generalized anxiety disorder     Psychophysiological insomnia     Menopausal syndrome (hot flashes)     CARDIOVASCULAR SCREENING; LDL GOAL LESS THAN 160     Arthritis of big toe-Left     Major depressive disorder, recurrent episode, mild (H)     Other acne     Bee sting allergy     Hyperlipidemia LDL goal <100     OAB (overactive bladder)     Tobacco use disorder     H/O abdominal supracervical subtotal hysterectomy     Intrinsic eczema     Fibrocystic breast changes of both breasts     Psoriasis     Osteoarthritis of spine with radiculopathy, lumbar region     Chronic pain of right knee     Past Surgical History:   Procedure Laterality Date     BACK SURGERY  ,     C/SECTION, LOW TRANSVERSE      C-Sections x2     ORTHOPEDIC SURGERY      great toe R fused.     PARTIAL HYSTERECTOMY      Menorrhagia, sparing cervix     TONSILLECTOMY & ADENOIDECTOMY         Social History     Tobacco Use     Smoking status: Former     Packs/day: 0.50     Years: 20.00     Pack years: 10.00     Types: Cigarettes     Quit date: 2022     Years since quittin.9     Smokeless tobacco: Never   Substance Use Topics     Alcohol use: No     Family History   Problem Relation Age of Onset     Thyroid Disease Mother         goiter and then drained and put on synthroid.     Diabetes Paternal Grandmother          Current Outpatient Medications   Medication Sig Dispense Refill     aspirin 81 MG EC tablet Take 81 mg by mouth daily       augmented betamethasone dipropionate (DIPROLENE AF) 0.05 % external cream Apply topically 2 times daily 50 g 1     cetirizine (ZYRTEC) 10 MG tablet Take 10 mg by mouth daily       Lactobacillus TABS Take 1 tablet by mouth daily.       LORazepam (ATIVAN) 0.5 MG tablet Take 1 tablet  (0.5 mg) by mouth every 6 hours as needed for anxiety 1 tablet daily prn 10 tablet 0     QUEtiapine (SEROQUEL) 50 MG tablet Take 1 tablet (50 mg) by mouth At Bedtime 90 tablet 3     sertraline (ZOLOFT) 100 MG tablet Take 1 tablet (100 mg) by mouth daily 90 tablet 3     EPINEPHrine (EPIPEN 2-TRES) 0.3 MG/0.3ML injection Inject 0.3 mLs (0.3 mg) into the muscle once as needed for anaphylaxis (Patient not taking: Reported on 2023) 2 each 1     Allergies   Allergen Reactions     Codeine Sulfate Hives     Paxil [Serotonin Reuptake Inhibitors (Ssris)]      Sleep problems, jittery     Recent Labs   Lab Test 18  0953 16  1432   A1C  --  5.6   * 146*   HDL 64 56   TRIG 73 114   ALT  --  16   CR  --  0.68   GFRESTIMATED  --  >90  Non  GFR Calc     GFRESTBLACK  --  >90   GFR Calc     POTASSIUM  --  3.9        Breast Cancer Screenin/26/2023     3:43 PM   Breast CA Risk Assessment (FHS-7)   Do you have a family history of breast, colon, or ovarian cancer? No / Unknown       click delete button to remove this line now  Mammogram Screening: Recommended mammography every 1-2 years with patient discussion and risk factor consideration  Pertinent mammograms are reviewed under the imaging tab.    History of abnormal Pap smear: NO - age 30-65 PAP every 5 years with negative HPV co-testing recommended      Latest Ref Rng & Units 2018     4:58 PM 2011     2:36 PM   PAP / HPV   PAP (Historical)  NIL  NIL    HPV 16 DNA NEG^Negative Negative     HPV 18 DNA NEG^Negative Negative     Other HR HPV NEG^Negative Negative       Reviewed and updated as needed this visit by clinical staff   Tobacco  Allergies  Meds              Reviewed and updated as needed this visit by Provider                 Past Medical History:   Diagnosis Date     Anxiety      Bee sting allergy 2016     CARDIOVASCULAR SCREENING; LDL GOAL LESS THAN 160 2011     Depressive disorder       Generalised anxiety disorder 1/17/2011     Insomnia 1/17/2011     Menopausal syndrome (hot flashes) 3/8/2011     OAB (overactive bladder) 1/24/2017     Other acne 4/26/2016     Panic attacks       Past Surgical History:   Procedure Laterality Date     BACK SURGERY  2001,2003     C/SECTION, LOW TRANSVERSE      C-Sections x2     ORTHOPEDIC SURGERY      great toe R fused.     PARTIAL HYSTERECTOMY  2007    Menorrhagia, sparing cervix     TONSILLECTOMY & ADENOIDECTOMY         Review of Systems   Constitutional: Negative for chills and fever.   HENT: Negative for congestion, ear pain, hearing loss and sore throat.    Eyes: Negative for pain and visual disturbance.   Respiratory: Negative for cough and shortness of breath.    Cardiovascular: Negative for chest pain, palpitations and peripheral edema.   Gastrointestinal: Negative for abdominal pain, constipation, diarrhea, heartburn, hematochezia and nausea.   Breasts:  Negative for tenderness, breast mass and discharge.   Genitourinary: Negative for dysuria, frequency, genital sores, hematuria, pelvic pain, urgency, vaginal bleeding and vaginal discharge.   Musculoskeletal: Positive for myalgias. Negative for arthralgias and joint swelling.   Skin: Negative for rash.   Neurological: Negative for dizziness, weakness, headaches and paresthesias.   Psychiatric/Behavioral: Negative for mood changes. The patient is not nervous/anxious.      CONSTITUTIONAL: NEGATIVE for fever, chills, change in weight  INTEGUMENTARY/SKIN: NEGATIVE for worrisome rashes, moles or lesions  EYES: NEGATIVE for vision changes or irritation  ENT: NEGATIVE for ear, mouth and throat problems  RESP: NEGATIVE for significant cough or SOB  BREAST: NEGATIVE for masses, tenderness or discharge  CV: NEGATIVE for chest pain, palpitations or peripheral edema  GI: NEGATIVE for nausea, abdominal pain, heartburn, or change in bowel habits  : NEGATIVE for unusual urinary or vaginal symptoms. No vaginal  "bleeding.  MUSCULOSKELETAL: NEGATIVE for significant arthralgias or myalgia  NEURO: NEGATIVE for weakness, dizziness or paresthesias  PSYCHIATRIC: NEGATIVE for changes in mood or affect      OBJECTIVE:   /76   Pulse 96   Temp 97.3  F (36.3  C) (Temporal)   Resp 18   Ht 1.626 m (5' 4\")   Wt 71.4 kg (157 lb 8 oz)   SpO2 97%   BMI 27.03 kg/m    Physical Exam  GENERAL: healthy, alert and no distress  EYES: Eyes grossly normal to inspection, PERRL and conjunctivae and sclerae normal  HENT: ear canals and TM's normal, nose and mouth without ulcers or lesions  NECK: no adenopathy, no asymmetry, masses, or scars and thyroid normal to palpation  RESP: lungs clear to auscultation - no rales, rhonchi or wheezes  BREAST: normal without masses, tenderness or nipple discharge and no palpable axillary masses or adenopathy, fibrocystic changes with both breasts noted today upon exam.  Medical assistant chaperone to me during the entirety of this part of exam.  CV: regular rate and rhythm, normal S1 S2, no S3 or S4, no murmur, click or rub, no peripheral edema and peripheral pulses strong  ABDOMEN: soft, nontender, no hepatosplenomegaly, no masses and bowel sounds normal   (female): normal female external genitalia, normal urethral meatus, vaginal mucosa pink, moist, well rugated, and normal cervix/adnexa/uterus without masses or discharge   (female): normal female external genitalia, normal urethral meatus , vaginal mucosal atrophy, normal cervix, adnexae, and uterus without masses vaginal atrophy noted with discomfort present upon exam.  Medical assistant accompanies me during this part of exam.  MS: no gross musculoskeletal defects noted, no edema  SKIN: no suspicious lesions or rashes  NEURO: Normal strength and tone, mentation intact and speech normal  PSYCH: mentation appears normal, affect normal/bright    Diagnostic Test Results:  Labs reviewed in Epic    ASSESSMENT/PLAN:   Nilsa was seen today for " physical.    Diagnoses and all orders for this visit:    Routine history and physical examination of adult  -     CBC with platelets and differential; Future  -     Comprehensive metabolic panel (BMP + Alb, Alk Phos, ALT, AST, Total. Bili, TP); Future  -     Lipid panel reflex to direct LDL Fasting; Future  -     TSH with free T4 reflex; Future  -     TSH with free T4 reflex  -     Lipid panel reflex to direct LDL Fasting  -     Comprehensive metabolic panel (BMP + Alb, Alk Phos, ALT, AST, Total. Bili, TP)  -     CBC with platelets and differential    Major depressive disorder, recurrent episode, mild (H)  -     QUEtiapine (SEROQUEL) 50 MG tablet; Take 1 tablet (50 mg) by mouth At Bedtime  -     sertraline (ZOLOFT) 100 MG tablet; Take 1 tablet (100 mg) by mouth daily  -     TSH with free T4 reflex; Future  -     TSH with free T4 reflex    Generalized anxiety disorder  -     LORazepam (ATIVAN) 0.5 MG tablet; Take 1 tablet (0.5 mg) by mouth every 6 hours as needed for anxiety 1 tablet daily prn  -     QUEtiapine (SEROQUEL) 50 MG tablet; Take 1 tablet (50 mg) by mouth At Bedtime  -     sertraline (ZOLOFT) 100 MG tablet; Take 1 tablet (100 mg) by mouth daily  -     TSH with free T4 reflex; Future  -     TSH with free T4 reflex    Intrinsic eczema  -     augmented betamethasone dipropionate (DIPROLENE AF) 0.05 % external cream; Apply topically 2 times daily    Hyperlipidemia LDL goal <100    Menopausal syndrome (hot flashes)    Fibrocystic breast changes of both breasts    Psoriasis  -     augmented betamethasone dipropionate (DIPROLENE AF) 0.05 % external cream; Apply topically 2 times daily    Osteoarthritis of spine with radiculopathy, lumbar region    Chronic pain of right knee    Cervical cancer screening  -     Pap Screen with HPV - recommended age 30 - 65 years    Screen for colon cancer  -     Fecal colorectal cancer screen FIT - Future (S+30); Future  -     Fecal colorectal cancer screen FIT - Future  "(S+30)    Dyspareunia in female    Atrophy of vagina    Other orders  -     ZOSTER RECOMBINANT ADJUVANTED (SHINGRIX)  -     TDAP VACCINE (Adacel, Boostrix)  -     REVIEW OF HEALTH MAINTENANCE PROTOCOL ORDERS    57-year-old female here for routine physical.  Repeat in 1 year    Depression is stable at this point in time but anxiety is still problematic.  We discussed adjusting medications but in the end decided against it and encouraged her to seek counseling.  Follow-up in 6 months is encouraged.  Patient has had a combination of eczema/psoriasis over the years.  Refilled her medication that has been    LDL goal is established.  Labs pending.  Follow-up based on results.    She has had some menopausal hot flashes, dyspareunia, atrophy of the vagina and fibrocystic breast disease over the years.  Further evaluation with mammogram is strongly recommended.  Follow-up as needed.    Patient struggled with spinal pain and radiculopathy.  Still has struggles with tenderness and muscle spasm to the upper trapezius belly on the left more than the right.  She does admit that this is where she carries her stress.  Advised exercises and encouraged her to do so on a regular basis and consider physical therapy in the near future.    Chronic pain of the right knee has been present for quite some time.  Range of motion is within normal limits.  Follow-up as needed.          Patient has been advised of split billing requirements and indicates understanding: Yes      COUNSELING:  Reviewed preventive health counseling, as reflected in patient instructions       Regular exercise       Healthy diet/nutrition       Vision screening       Hearing screening      BMI:   Estimated body mass index is 27.03 kg/m  as calculated from the following:    Height as of this encounter: 1.626 m (5' 4\").    Weight as of this encounter: 71.4 kg (157 lb 8 oz).   Weight management plan: Discussed healthy diet and exercise guidelines      She reports that " she quit smoking about 10 months ago. Her smoking use included cigarettes. She has a 10.00 pack-year smoking history. She has never used smokeless tobacco.      Nick Tam PA-C  [facility]  Answers for HPI/ROS submitted by the patient on 6/30/2023  If you checked off any problems, how difficult have these problems made it for you to do your work, take care of things at home, or get along with other people?: Somewhat difficult  PHQ9 TOTAL SCORE: 3

## 2023-07-02 ENCOUNTER — MYC MEDICAL ADVICE (OUTPATIENT)
Dept: FAMILY MEDICINE | Facility: OTHER | Age: 58
End: 2023-07-02

## 2023-07-02 DIAGNOSIS — E78.5 HYPERLIPIDEMIA LDL GOAL <100: Primary | ICD-10-CM

## 2023-07-03 RX ORDER — ROSUVASTATIN CALCIUM 10 MG/1
10 TABLET, COATED ORAL DAILY
Qty: 90 TABLET | Refills: 3 | Status: SHIPPED | OUTPATIENT
Start: 2023-07-03

## 2023-07-07 LAB
BKR LAB AP GYN ADEQUACY: NORMAL
BKR LAB AP GYN INTERPRETATION: NORMAL
BKR LAB AP HPV REFLEX: NORMAL
BKR LAB AP PREVIOUS ABNORMAL: NORMAL
PATH REPORT.COMMENTS IMP SPEC: NORMAL
PATH REPORT.COMMENTS IMP SPEC: NORMAL
PATH REPORT.RELEVANT HX SPEC: NORMAL

## 2023-07-10 LAB
HUMAN PAPILLOMA VIRUS 16 DNA: NEGATIVE
HUMAN PAPILLOMA VIRUS 18 DNA: NEGATIVE
HUMAN PAPILLOMA VIRUS FINAL DIAGNOSIS: NORMAL
HUMAN PAPILLOMA VIRUS OTHER HR: NEGATIVE

## 2024-01-25 DIAGNOSIS — F41.1 GENERALIZED ANXIETY DISORDER: ICD-10-CM

## 2024-01-25 RX ORDER — LORAZEPAM 0.5 MG/1
0.5 TABLET ORAL EVERY 6 HOURS PRN
Qty: 10 TABLET | Refills: 0 | Status: SHIPPED | OUTPATIENT
Start: 2024-01-25 | End: 2024-01-26

## 2024-01-26 RX ORDER — LORAZEPAM 0.5 MG/1
TABLET ORAL
Start: 2024-01-26 | End: 2024-07-05

## 2024-01-26 NOTE — TELEPHONE ENCOUNTER
Patient informed, She uses very infrequently, put her dog down this morning.     Shaylee Norris MA

## 2024-01-29 NOTE — TELEPHONE ENCOUNTER
Alcohol Withdrawal  Alcohol withdrawal usually begins after prolonged or heavy drinking for a number of days, and then you suddenly stop drinking, or cut down on your alcohol use. It is not one thing, but is a complex combination of signs and symptoms that generally occur to together and define a particular problem or condition.  · Alcohol withdrawal is potentially life-threatening , and is a medical emergency  · It can start as early as a couple of hours after your last drink, or may take 1 to 3 days to develop  · It can last from days to a week or more.  · It can worsen very quickly.  Signs and symptoms  There are 4 stages of alcohol withdrawal, although they overlap as do their signs and symptoms. In the earlier stages, it most commonly includes:  · Anxiety  · Shakiness  · Nausea and vomiting  · Sweating  · Insomnia  · Headaches  · Fever  · Mood swings, irritability, agitation, restlessness  · Confusion, disorientation, hallucinations  Delirium tremens (DTs)  DTs are the work stage of the alcohol withdrawal syndrome. If it happens, it usually begins about 3 to 5 days after your last drink. It is potentially life threatening. DTs are characterized by:  · Sudden and severe mental or nervous system changes  · Uncontrollable tremors  · Severe disorientation, confusion, hallucinations  · Heart racing, or irregular heartbeat  · High blood pressure  · Seizures  · Possible coma and death  Home care  · You will need plenty of rest and fluids over the next several days. Eat regular meals. Do not drink any more alcohol. During this time, it is best that you stay with family or friends who can help and support you. You can also admit yourself to a residential detox program.  · Do not drive until all symptoms are gone and you are feeling better. Don't drive until you have been examined by a doctor if you've had a seizure.  · If you were given sedative medication to reduce your symptoms, do not take it more often than  Called patient and she is not going to come in since she has no insurance and that she shouldn't have to come in 50 times in a year and get billed for that. She also said that she normally gets a year fill for it.    prescribed and never take it with alcohol.  Follow-up care  Once you have gone through the withdrawal symptoms, you have fought half of the simpson. To avoid the risk of returning to your previous drinking pattern, it is essential that you get follow-up support and treatment.  · Alcoholics Anonymous offers support through a self-help fellowship. There are no dues or fees. See the Yellow Pages and call for time and place of meetings. www.aa.org  · [facility] offers support to families of alcohol users. 301.403.5683 www.al-anon.org  · [employer] [place] On Alcoholism And Drug Dependence 213-971-8351 www.ncadd.org  · Residential alcohol detox programs are available. Check the Yellow Pages under Drug Abuse & Treatment Centers.  Call 911  Call 911 if any of these occur:  · Seizure  · Trouble breathing or slow, irregular breathing  · Chest pain  · Sudden weakness on one side of the body or sudden trouble speaking  · Heavy bleeding or vomiting blood  · Very drowsy or trouble awakening  · Fainting or loss of consciousness  · Rapid heart rate  When to seek medical advice  Call your health care provider right away if any of these occur:  · Severe shakiness  · Hallucinations  · Fever over 100.4º F (38.0º C)  · Headache, confusion, extreme drowsiness, inability to awaken  · Increasing upper abdominal pain  · Repeated vomiting  © 1625-3396 [employer]. [address] [place] [address], [place] [address]. All rights reserved. This information is not intended as a substitute for professional medical care. Always follow your healthcare professional's instructions.

## 2024-04-29 ENCOUNTER — MYC REFILL (OUTPATIENT)
Dept: FAMILY MEDICINE | Facility: OTHER | Age: 59
End: 2024-04-29

## 2024-04-29 DIAGNOSIS — F41.1 GENERALIZED ANXIETY DISORDER: ICD-10-CM

## 2024-04-29 DIAGNOSIS — F33.0 MAJOR DEPRESSIVE DISORDER, RECURRENT EPISODE, MILD (H): ICD-10-CM

## 2024-04-29 RX ORDER — QUETIAPINE FUMARATE 50 MG/1
50 TABLET, FILM COATED ORAL AT BEDTIME
Qty: 90 TABLET | Refills: 3 | Status: SHIPPED | OUTPATIENT
Start: 2024-04-29 | End: 2024-07-05

## 2024-07-04 DIAGNOSIS — F41.1 GENERALIZED ANXIETY DISORDER: ICD-10-CM

## 2024-07-04 DIAGNOSIS — F33.0 MAJOR DEPRESSIVE DISORDER, RECURRENT EPISODE, MILD (H): ICD-10-CM

## 2024-07-05 ENCOUNTER — E-VISIT (OUTPATIENT)
Dept: FAMILY MEDICINE | Facility: OTHER | Age: 59
End: 2024-07-05

## 2024-07-05 DIAGNOSIS — F41.1 GENERALIZED ANXIETY DISORDER: ICD-10-CM

## 2024-07-05 DIAGNOSIS — F33.0 MAJOR DEPRESSIVE DISORDER, RECURRENT EPISODE, MILD (H): ICD-10-CM

## 2024-07-05 PROCEDURE — 99421 OL DIG E/M SVC 5-10 MIN: CPT | Performed by: PHYSICIAN ASSISTANT

## 2024-07-05 RX ORDER — LORAZEPAM 0.5 MG/1
TABLET ORAL
Qty: 30 TABLET | Refills: 0 | Status: SHIPPED | OUTPATIENT
Start: 2024-07-05

## 2024-07-05 RX ORDER — SERTRALINE HYDROCHLORIDE 100 MG/1
100 TABLET, FILM COATED ORAL DAILY
Qty: 90 TABLET | Refills: 3 | OUTPATIENT
Start: 2024-07-05

## 2024-07-05 RX ORDER — SERTRALINE HYDROCHLORIDE 100 MG/1
100 TABLET, FILM COATED ORAL DAILY
Qty: 90 TABLET | Refills: 0 | Status: SHIPPED | OUTPATIENT
Start: 2024-07-05 | End: 2024-07-13

## 2024-07-05 RX ORDER — QUETIAPINE FUMARATE 50 MG/1
50 TABLET, FILM COATED ORAL AT BEDTIME
Qty: 90 TABLET | Refills: 0 | Status: SHIPPED | OUTPATIENT
Start: 2024-07-05

## 2024-07-05 NOTE — PATIENT INSTRUCTIONS
Thank you for choosing us for your care. I think an in-clinic or virtual visit would be the best next step based on your symptoms. Please schedule a clinic appointment; you won t be charged for this eVisit.      You can schedule an appointment by clicking here in [place], or call [phone].

## 2024-07-13 ENCOUNTER — MYC REFILL (OUTPATIENT)
Dept: FAMILY MEDICINE | Facility: OTHER | Age: 59
End: 2024-07-13

## 2024-07-13 DIAGNOSIS — F41.1 GENERALIZED ANXIETY DISORDER: ICD-10-CM

## 2024-07-13 DIAGNOSIS — F33.0 MAJOR DEPRESSIVE DISORDER, RECURRENT EPISODE, MILD (H): ICD-10-CM

## 2024-07-15 RX ORDER — SERTRALINE HYDROCHLORIDE 100 MG/1
100 TABLET, FILM COATED ORAL DAILY
Qty: 90 TABLET | Refills: 0 | Status: SHIPPED | OUTPATIENT
Start: 2024-07-15

## 2024-07-24 ENCOUNTER — MYC MEDICAL ADVICE (OUTPATIENT)
Dept: FAMILY MEDICINE | Facility: OTHER | Age: 59
End: 2024-07-24

## 2024-08-10 ENCOUNTER — HEALTH MAINTENANCE LETTER (OUTPATIENT)
Age: 59
End: 2024-08-10

## 2024-10-16 DIAGNOSIS — F33.0 MAJOR DEPRESSIVE DISORDER, RECURRENT EPISODE, MILD (H): ICD-10-CM

## 2024-10-16 DIAGNOSIS — F41.1 GENERALIZED ANXIETY DISORDER: ICD-10-CM

## 2024-10-17 RX ORDER — QUETIAPINE FUMARATE 50 MG/1
50 TABLET, FILM COATED ORAL AT BEDTIME
Qty: 30 TABLET | Refills: 0 | Status: SHIPPED | OUTPATIENT
Start: 2024-10-17

## 2024-10-17 RX ORDER — SERTRALINE HYDROCHLORIDE 100 MG/1
100 TABLET, FILM COATED ORAL DAILY
Qty: 30 TABLET | Refills: 0 | Status: SHIPPED | OUTPATIENT
Start: 2024-10-17

## 2024-11-05 ENCOUNTER — TELEPHONE (OUTPATIENT)
Dept: FAMILY MEDICINE | Facility: OTHER | Age: 59
End: 2024-11-05

## 2024-11-05 NOTE — TELEPHONE ENCOUNTER
Per chart review patient has already rescheduled for an in person appointment on Thursday 11/7. Will close this encounter.

## 2025-08-16 ENCOUNTER — HEALTH MAINTENANCE LETTER (OUTPATIENT)
Age: 60
End: 2025-08-16